# Patient Record
Sex: FEMALE | Race: WHITE | NOT HISPANIC OR LATINO
[De-identification: names, ages, dates, MRNs, and addresses within clinical notes are randomized per-mention and may not be internally consistent; named-entity substitution may affect disease eponyms.]

---

## 2020-10-27 ENCOUNTER — APPOINTMENT (OUTPATIENT)
Dept: VASCULAR SURGERY | Facility: CLINIC | Age: 65
End: 2020-10-27
Payer: MEDICARE

## 2020-10-27 VITALS
WEIGHT: 112 LBS | BODY MASS INDEX: 19.12 KG/M2 | HEIGHT: 64 IN | TEMPERATURE: 99.1 F | SYSTOLIC BLOOD PRESSURE: 125 MMHG | DIASTOLIC BLOOD PRESSURE: 71 MMHG | OXYGEN SATURATION: 96 % | HEART RATE: 65 BPM

## 2020-10-27 DIAGNOSIS — I83.893 VARICOSE VEINS OF BILATERAL LOWER EXTREMITIES WITH OTHER COMPLICATIONS: ICD-10-CM

## 2020-10-27 DIAGNOSIS — I83.813 VARICOSE VEINS OF BILATERAL LOWER EXTREMITIES WITH PAIN: ICD-10-CM

## 2020-10-27 DIAGNOSIS — I83.10 VARICOSE VEINS OF UNSPECIFIED LOWER EXTREMITY WITH INFLAMMATION: ICD-10-CM

## 2020-10-27 PROBLEM — Z00.00 ENCOUNTER FOR PREVENTIVE HEALTH EXAMINATION: Status: ACTIVE | Noted: 2020-10-27

## 2020-10-27 PROCEDURE — 99203 OFFICE O/P NEW LOW 30 MIN: CPT | Mod: 25

## 2020-10-27 PROCEDURE — 93970 EXTREMITY STUDY: CPT

## 2020-10-27 NOTE — PHYSICAL EXAM
[JVD] : no jugular venous distention  [Normal Breath Sounds] : Normal breath sounds [2+] : left 2+ [Ankle Swelling (On Exam)] : present [Varicose Veins Of Lower Extremities] : present [Varicose Veins Of The Right Leg] : of the right leg [Ankle Swelling Bilaterally] : severe [] : bilaterally [Ankle Swelling On The Left] : moderate [No Rash or Lesion] : No rash or lesion [Purpura] : no purpura  [Petechiae] : no petechiae [Skin Ulcer] : no ulcer [Skin Induration] : no induration [Alert] : alert [Oriented to Person] : oriented to person [Oriented to Place] : oriented to place [Oriented to Time] : oriented to time [Calm] : calm [de-identified] : wdwn nad [de-identified] : wnl [de-identified] : shellyl [FreeTextEntry1] : huge rt leg branch varicose veins [de-identified] : wnl [de-identified] : as above

## 2020-10-27 NOTE — DATA REVIEWED
[FreeTextEntry1] : RVT Mike performed b/l vle - shows severe rt gsv, rt lsv and left gsv reflux with huge rt leg branch varicose veins

## 2020-10-27 NOTE — PHYSICAL EXAM
[JVD] : no jugular venous distention  [Normal Breath Sounds] : Normal breath sounds [2+] : left 2+ [Ankle Swelling (On Exam)] : present [Varicose Veins Of Lower Extremities] : present [Varicose Veins Of The Right Leg] : of the right leg [Ankle Swelling Bilaterally] : severe [] : bilaterally [Ankle Swelling On The Left] : moderate [No Rash or Lesion] : No rash or lesion [Purpura] : no purpura  [Petechiae] : no petechiae [Skin Ulcer] : no ulcer [Skin Induration] : no induration [Alert] : alert [Oriented to Person] : oriented to person [Oriented to Place] : oriented to place [Oriented to Time] : oriented to time [Calm] : calm [de-identified] : wdwn nad [de-identified] : wnl [de-identified] : shellyl [FreeTextEntry1] : huge rt leg branch varicose veins [de-identified] : wnl [de-identified] : as above

## 2020-10-27 NOTE — PHYSICAL EXAM
[JVD] : no jugular venous distention  [Normal Breath Sounds] : Normal breath sounds [2+] : left 2+ [Ankle Swelling (On Exam)] : present [Varicose Veins Of Lower Extremities] : present [Varicose Veins Of The Right Leg] : of the right leg [Ankle Swelling Bilaterally] : severe [] : bilaterally [Ankle Swelling On The Left] : moderate [No Rash or Lesion] : No rash or lesion [Purpura] : no purpura  [Petechiae] : no petechiae [Skin Ulcer] : no ulcer [Skin Induration] : no induration [Alert] : alert [Oriented to Person] : oriented to person [Oriented to Place] : oriented to place [Oriented to Time] : oriented to time [Calm] : calm [de-identified] : wdwn nad [de-identified] : wnl [de-identified] : shellyl [FreeTextEntry1] : huge rt leg branch varicose veins [de-identified] : wnl [de-identified] : as above

## 2020-10-27 NOTE — ASSESSMENT
[FreeTextEntry1] : Vascular surgeon discussed with the patient:\par Varicose veins are enlarged, twisted veins. Varicose veins are caused by increased blood pressure in the veins.  The blood moves towards the heart by 1-way valves in the veins. When the valves become weakened or damaged, blood can collect in the veins. This causes the veins to become enlarged. Sitting or standing for long periods can cause blood to pool in the leg veins, increasing the pressure within the veins. The veins can stretch from the increased pressure. This may weaken the walls of the veins and damage the valves.\par Varicose veins may be more common in some families (inherited).  Factors that may increase pressure include:  obesity, older age, being female, pregnancy, taking oral contraceptive pills or hormone replacement, being inactive, and/or smoking. \par The most common symptoms of varicose veins are sensations in the legs, such as a heavy feeling, burning, and/or aching. However, each individual may experience symptoms differently.  Other symptoms may include:  color changes in the skin, sores on the legs, or rash.  Severe varicose veins may eventually produce long-term mild swelling that can result in more serious skin and tissue problems, such as ulcers and non-healing sores.\par Varicose veins are diagnosed by a complete medical history, physical examination, and diagnostic studies for varicose veins including duplex ultrasound and color-flow imaging.  \par Medical treatment for varicose veins include:  compression stockings, sclerotherapy, endovenous laser ablation and/or surgical treatment microphlebectomy.  \par \par \par  [Other: _____] : [unfilled]

## 2020-10-27 NOTE — HISTORY OF PRESENT ILLNESS
[FreeTextEntry1] : Vascular surgeon discussed with the patient:\par Varicose veins are enlarged, twisted veins. Varicose veins are caused by increased blood pressure in the veins.  The blood moves towards the heart by 1-way valves in the veins. When the valves become weakened or damaged, blood can collect in the veins. This causes the veins to become enlarged. Sitting or standing for long periods can cause blood to pool in the leg veins, increasing the pressure within the veins. The veins can stretch from the increased pressure. This may weaken the walls of the veins and damage the valves.\par Varicose veins may be more common in some families (inherited).  Factors that may increase pressure include:  obesity, older age, being female, pregnancy, taking oral contraceptive pills or hormone replacement, being inactive, and/or smoking. \par The most common symptoms of varicose veins are sensations in the legs, such as a heavy feeling, burning, and/or aching. However, each individual may experience symptoms differently.  Other symptoms may include:  color changes in the skin, sores on the legs, or rash.  Severe varicose veins may eventually produce long-term mild swelling that can result in more serious skin and tissue problems, such as ulcers and non-healing sores.\par Varicose veins are diagnosed by a complete medical history, physical examination, and diagnostic studies for varicose veins including duplex ultrasound and color-flow imaging.  \par Medical treatment for varicose veins include:  compression stockings, sclerotherapy, endovenous laser ablation and/or surgical treatment microphlebectomy.  \par \par \par

## 2020-10-30 ENCOUNTER — TRANSCRIPTION ENCOUNTER (OUTPATIENT)
Age: 65
End: 2020-10-30

## 2020-11-03 ENCOUNTER — APPOINTMENT (OUTPATIENT)
Dept: VASCULAR SURGERY | Facility: CLINIC | Age: 65
End: 2020-11-03
Payer: MEDICARE

## 2020-11-03 VITALS
DIASTOLIC BLOOD PRESSURE: 72 MMHG | HEART RATE: 68 BPM | OXYGEN SATURATION: 97 % | SYSTOLIC BLOOD PRESSURE: 108 MMHG | TEMPERATURE: 98.8 F

## 2020-11-03 PROCEDURE — 36478 ENDOVENOUS LASER 1ST VEIN: CPT | Mod: RT

## 2020-11-03 NOTE — PROCEDURE
[FreeTextEntry1] : pt to return for rt gvs, rt lsv, left gsv evlt \par Rx given for thigh high support hose 20-30 mmhg [FreeTextEntry3] : Right gsv endovenous laser ablation. \par \par Right Lower extremity varicose veins with inflammation, leg pain, leg swelling, and leg cramping.  Venous insufficiency, chronic venous hypertension and venous reflux.\par \par Ms. MARI CHÁVEZ is a 65 year year old F with a history of right lower extremity varicose veins previously seen in the office.  Ultrasound examination demonstrated reflux in the right gsv vein dumping into the patient’s varicosities in the right leg.  A trial of compression stockings, exercise, elevation, and pain medication was attempted without relief and as such, this patient was offered endovenous laser ablation therapy.  After explaining risks and benefits, informed consent was obtained and the patient agreed to proceed.  \par \par The patient was escorted to the procedure room and placed in the supine position on the examination table.  Laser safety glasses were placed on the patient.  The right leg was prepped and draped in the usual sterile fashion and time-out was called.  A sonographic probe was placed into a sterile sheath and the right lower extremity was imaged.  The right greater saphenous vein was identified under sonographic guidance and 1 mL of 1% lidocaine was administered subcutaneously using a 25G needle.  Using standard Seldinger technique, access to the right greater saphenous vein with the needle and corresponding guidewire was obtained.  The 4Fr Raymundo-Sheath introducer was advanced over the wire to the treatment location.  The position of the sheath tip is 2 cm below the Sapheno-Femoral Junction and verified using ultrasound guidance.  Dilator and guidewire removed.   NeverTouch laser fiber inserted into the sheath until the Fiber Stop Assembly came in contact with the end of the Sheath Hub.  Fiber stop assembly removed and pulled the sheath back and locked to the Sheath-Nisa fitting.  Fiber location confirmed using ultrasound guidance.\par \par Local tumescent anesthesia under ultrasound guidance was administered to ensure delivery of just enough anesthesia, approximately 250 mL, to achieve thermal protection.\par Anesthesia:  Tumescent anesthesia.  Tumescent anesthesia:  250 mL 0.9% Sodium Chloride for injection poured into sterile blue basin and added 83 mL sterile injectable lidocaine 1% (without Epinephrine) using 20 mL syringe.\par \par Laser was set to continuous mode and adjusted to desired power.  Laser placed in Enable mode.  The laser was activated by depressing the foot pedal while withdrawing the fiber and sheath together at a rate of 1 cm per 5 seconds.  The operating of the laser was ceased by removing the foot from foot pedal when the fiber end was 2 - 3 cm from the access site as indicated by markers on the distal tip of the Raymundo-Sheath Introducer.   The sheath and laser fiber was removed and manual pressure applied at access site for 5 minutes.  Post procedure, the vein was imaged and showed successful closure of the right greater saphenous vein.  Dry dressing applied to access site, compression stocking 20 - 30 mm Hg applied to treated extremity.  The patient tolerated the procedure well with no complications.  Vital signs stable, patient was monitored throughout procedure.  The patient was escorted to the changing room.  \par \par Refer to procedure sheet for procedure start and end time, and reddy, length, energy and time documentation. \par \par Post-Op Instructions:  The following instructions were reviewed with the patient and a print out of the instructions provided to patient at time of visit:   1)  Compression stocking to be worn 24 hours per day x 7 days.  2)  Do not get the stocking wet for the first 3 days.  3)  Ambulation immediately following procedure and as much as possible for the first 7 days.  4)  Contact the office if any questions or concerns. 5)  Patient must follow-up within the first week for post procedure reflux study performed in office.  Reviewed with the patient the follow up visit is to ensure that there are no complications such as a deep vein thrombosis (DVT).  Patient acknowledged understanding of post-op instructions and was provided with print out of instructions at time of visit.  \par \par Reviewed with the patient post-procedure endovenous laser ablation (EVLT) instructions, provided patient with printed copy of instructions along with Dr. Wagner’s cell phone number:  [phone], and advised patient to call for any questions or concerns prior to follow up visit.  All questions answered. \par \par

## 2020-11-03 NOTE — REASON FOR VISIT
[Procedure: _________] : a [unfilled] procedure visit [FreeTextEntry1] : Patient has severe reflux right gsv and here for right gsv EVLT procedure.  MARI CHÁVEZ proceeded with right gsv EVLT with no complications.  MARI CHÁVEZ will follow up in one week for routine visit with VLE.\par

## 2020-11-03 NOTE — REASON FOR VISIT
[Initial Evaluation] : an initial evaluation [Procedure: _________] : a [unfilled] procedure visit [FreeTextEntry1] : Patient has severe reflux right gsv and here for right gsv EVLT procedure.  MARI CHÁVEZ proceeded with right gsv EVLT with no complications.  MARI CHÁVEZ will follow up in one week for routine visit with VLE.\par

## 2020-11-03 NOTE — PROCEDURE
[FreeTextEntry1] : pt to return for rt gvs, rt lsv, left gsv evlt \par Rx given for thigh high support hose 20-30 mmhg [FreeTextEntry3] : Right gsv endovenous laser ablation. \par \par Right Lower extremity varicose veins with inflammation, leg pain, leg swelling, and leg cramping.  Venous insufficiency, chronic venous hypertension and venous reflux.\par \par Ms. MARI CHÁVEZ is a 65 year year old F with a history of right lower extremity varicose veins previously seen in the office.  Ultrasound examination demonstrated reflux in the right gsv vein dumping into the patient’s varicosities in the right leg.  A trial of compression stockings, exercise, elevation, and pain medication was attempted without relief and as such, this patient was offered endovenous laser ablation therapy.  After explaining risks and benefits, informed consent was obtained and the patient agreed to proceed.  \par \par The patient was escorted to the procedure room and placed in the supine position on the examination table.  Laser safety glasses were placed on the patient.  The right leg was prepped and draped in the usual sterile fashion and time-out was called.  A sonographic probe was placed into a sterile sheath and the right lower extremity was imaged.  The right greater saphenous vein was identified under sonographic guidance and 1 mL of 1% lidocaine was administered subcutaneously using a 25G needle.  Using standard Seldinger technique, access to the right greater saphenous vein with the needle and corresponding guidewire was obtained.  The 4Fr Raymundo-Sheath introducer was advanced over the wire to the treatment location.  The position of the sheath tip is 2 cm below the Sapheno-Femoral Junction and verified using ultrasound guidance.  Dilator and guidewire removed.   NeverTouch laser fiber inserted into the sheath until the Fiber Stop Assembly came in contact with the end of the Sheath Hub.  Fiber stop assembly removed and pulled the sheath back and locked to the Sheath-Nisa fitting.  Fiber location confirmed using ultrasound guidance.\par \par Local tumescent anesthesia under ultrasound guidance was administered to ensure delivery of just enough anesthesia, approximately 250 mL, to achieve thermal protection.\par Anesthesia:  Tumescent anesthesia.  Tumescent anesthesia:  250 mL 0.9% Sodium Chloride for injection poured into sterile blue basin and added 83 mL sterile injectable lidocaine 1% (without Epinephrine) using 20 mL syringe.\par \par Laser was set to continuous mode and adjusted to desired power.  Laser placed in Enable mode.  The laser was activated by depressing the foot pedal while withdrawing the fiber and sheath together at a rate of 1 cm per 5 seconds.  The operating of the laser was ceased by removing the foot from foot pedal when the fiber end was 2 - 3 cm from the access site as indicated by markers on the distal tip of the Raymundo-Sheath Introducer.   The sheath and laser fiber was removed and manual pressure applied at access site for 5 minutes.  Post procedure, the vein was imaged and showed successful closure of the right greater saphenous vein.  Dry dressing applied to access site, compression stocking 20 - 30 mm Hg applied to treated extremity.  The patient tolerated the procedure well with no complications.  Vital signs stable, patient was monitored throughout procedure.  The patient was escorted to the changing room.  \par \par Refer to procedure sheet for procedure start and end time, and reddy, length, energy and time documentation. \par \par Post-Op Instructions:  The following instructions were reviewed with the patient and a print out of the instructions provided to patient at time of visit:   1)  Compression stocking to be worn 24 hours per day x 7 days.  2)  Do not get the stocking wet for the first 3 days.  3)  Ambulation immediately following procedure and as much as possible for the first 7 days.  4)  Contact the office if any questions or concerns. 5)  Patient must follow-up within the first week for post procedure reflux study performed in office.  Reviewed with the patient the follow up visit is to ensure that there are no complications such as a deep vein thrombosis (DVT).  Patient acknowledged understanding of post-op instructions and was provided with print out of instructions at time of visit.  \par \par Reviewed with the patient post-procedure endovenous laser ablation (EVLT) instructions, provided patient with printed copy of instructions along with Dr. Wagner’s cell phone number:  [phone], and advised patient to call for any questions or concerns prior to follow up visit.  All questions answered. \par \par

## 2020-11-03 NOTE — PROCEDURE
[FreeTextEntry1] : pt to return for rt gvs, rt lsv, left gsv evlt \par Rx given for thigh high support hose 20-30 mmhg [FreeTextEntry3] : Right gsv endovenous laser ablation. \par \par Right Lower extremity varicose veins with inflammation, leg pain, leg swelling, and leg cramping.  Venous insufficiency, chronic venous hypertension and venous reflux.\par \par Ms. MARI CHÁVEZ is a 65 year year old F with a history of right lower extremity varicose veins previously seen in the office.  Ultrasound examination demonstrated reflux in the right gsv vein dumping into the patient’s varicosities in the right leg.  A trial of compression stockings, exercise, elevation, and pain medication was attempted without relief and as such, this patient was offered endovenous laser ablation therapy.  After explaining risks and benefits, informed consent was obtained and the patient agreed to proceed.  \par \par The patient was escorted to the procedure room and placed in the supine position on the examination table.  Laser safety glasses were placed on the patient.  The right leg was prepped and draped in the usual sterile fashion and time-out was called.  A sonographic probe was placed into a sterile sheath and the right lower extremity was imaged.  The right greater saphenous vein was identified under sonographic guidance and 1 mL of 1% lidocaine was administered subcutaneously using a 25G needle.  Using standard Seldinger technique, access to the right greater saphenous vein with the needle and corresponding guidewire was obtained.  The 4Fr Raymundo-Sheath introducer was advanced over the wire to the treatment location.  The position of the sheath tip is 2 cm below the Sapheno-Femoral Junction and verified using ultrasound guidance.  Dilator and guidewire removed.   NeverTouch laser fiber inserted into the sheath until the Fiber Stop Assembly came in contact with the end of the Sheath Hub.  Fiber stop assembly removed and pulled the sheath back and locked to the Sheath-Nisa fitting.  Fiber location confirmed using ultrasound guidance.\par \par Local tumescent anesthesia under ultrasound guidance was administered to ensure delivery of just enough anesthesia, approximately 250 mL, to achieve thermal protection.\par Anesthesia:  Tumescent anesthesia.  Tumescent anesthesia:  250 mL 0.9% Sodium Chloride for injection poured into sterile blue basin and added 83 mL sterile injectable lidocaine 1% (without Epinephrine) using 20 mL syringe.\par \par Laser was set to continuous mode and adjusted to desired power.  Laser placed in Enable mode.  The laser was activated by depressing the foot pedal while withdrawing the fiber and sheath together at a rate of 1 cm per 5 seconds.  The operating of the laser was ceased by removing the foot from foot pedal when the fiber end was 2 - 3 cm from the access site as indicated by markers on the distal tip of the Raymundo-Sheath Introducer.   The sheath and laser fiber was removed and manual pressure applied at access site for 5 minutes.  Post procedure, the vein was imaged and showed successful closure of the right greater saphenous vein.  Dry dressing applied to access site, compression stocking 20 - 30 mm Hg applied to treated extremity.  The patient tolerated the procedure well with no complications.  Vital signs stable, patient was monitored throughout procedure.  The patient was escorted to the changing room.  \par \par Refer to procedure sheet for procedure start and end time, and reddy, length, energy and time documentation. \par \par Post-Op Instructions:  The following instructions were reviewed with the patient and a print out of the instructions provided to patient at time of visit:   1)  Compression stocking to be worn 24 hours per day x 7 days.  2)  Do not get the stocking wet for the first 3 days.  3)  Ambulation immediately following procedure and as much as possible for the first 7 days.  4)  Contact the office if any questions or concerns. 5)  Patient must follow-up within the first week for post procedure reflux study performed in office.  Reviewed with the patient the follow up visit is to ensure that there are no complications such as a deep vein thrombosis (DVT).  Patient acknowledged understanding of post-op instructions and was provided with print out of instructions at time of visit.  \par \par Reviewed with the patient post-procedure endovenous laser ablation (EVLT) instructions, provided patient with printed copy of instructions along with Dr. Wagner’s cell phone number:  [phone], and advised patient to call for any questions or concerns prior to follow up visit.  All questions answered. \par \par

## 2020-11-03 NOTE — PROCEDURE
[FreeTextEntry3] : Right gsv endovenous laser ablation. \par \par Right Lower extremity varicose veins with inflammation, leg pain, leg swelling, and leg cramping.  Venous insufficiency, chronic venous hypertension and venous reflux.\par \par Ms. MARI CHÁVEZ is a 65 year year old F with a history of right lower extremity varicose veins previously seen in the office.  Ultrasound examination demonstrated reflux in the right gsv vein dumping into the patient’s varicosities in the right leg.  A trial of compression stockings, exercise, elevation, and pain medication was attempted without relief and as such, this patient was offered endovenous laser ablation therapy.  After explaining risks and benefits, informed consent was obtained and the patient agreed to proceed.  \par \par The patient was escorted to the procedure room and placed in the supine position on the examination table.  Laser safety glasses were placed on the patient.  The right leg was prepped and draped in the usual sterile fashion and time-out was called.  A sonographic probe was placed into a sterile sheath and the right lower extremity was imaged.  The right greater saphenous vein was identified under sonographic guidance and 1 mL of 1% lidocaine was administered subcutaneously using a 25G needle.  Using standard Seldinger technique, access to the right greater saphenous vein with the needle and corresponding guidewire was obtained.  The 4Fr Raymundo-Sheath introducer was advanced over the wire to the treatment location.  The position of the sheath tip is 2 cm below the Sapheno-Femoral Junction and verified using ultrasound guidance.  Dilator and guidewire removed.   NeverTouch laser fiber inserted into the sheath until the Fiber Stop Assembly came in contact with the end of the Sheath Hub.  Fiber stop assembly removed and pulled the sheath back and locked to the Sheath-Nisa fitting.  Fiber location confirmed using ultrasound guidance.\par \par Local tumescent anesthesia under ultrasound guidance was administered to ensure delivery of just enough anesthesia, approximately 250 mL, to achieve thermal protection.\par Anesthesia:  Tumescent anesthesia.  Tumescent anesthesia:  250 mL 0.9% Sodium Chloride for injection poured into sterile blue basin and added 83 mL sterile injectable lidocaine 1% (without Epinephrine) using 20 mL syringe.\par \par Laser was set to continuous mode and adjusted to desired power.  Laser placed in Enable mode.  The laser was activated by depressing the foot pedal while withdrawing the fiber and sheath together at a rate of 1 cm per 5 seconds.  The operating of the laser was ceased by removing the foot from foot pedal when the fiber end was 2 - 3 cm from the access site as indicated by markers on the distal tip of the Raymundo-Sheath Introducer.   The sheath and laser fiber was removed and manual pressure applied at access site for 5 minutes.  Post procedure, the vein was imaged and showed successful closure of the right greater saphenous vein.  Dry dressing applied to access site, compression stocking 20 - 30 mm Hg applied to treated extremity.  The patient tolerated the procedure well with no complications.  Vital signs stable, patient was monitored throughout procedure.  The patient was escorted to the changing room.  \par \par Refer to procedure sheet for procedure start and end time, and reddy, length, energy and time documentation. \par \par Post-Op Instructions:  The following instructions were reviewed with the patient and a print out of the instructions provided to patient at time of visit:   1)  Compression stocking to be worn 24 hours per day x 7 days.  2)  Do not get the stocking wet for the first 3 days.  3)  Ambulation immediately following procedure and as much as possible for the first 7 days.  4)  Contact the office if any questions or concerns. 5)  Patient must follow-up within the first week for post procedure reflux study performed in office.  Reviewed with the patient the follow up visit is to ensure that there are no complications such as a deep vein thrombosis (DVT).  Patient acknowledged understanding of post-op instructions and was provided with print out of instructions at time of visit.  \par \par Reviewed with the patient post-procedure endovenous laser ablation (EVLT) instructions, provided patient with printed copy of instructions along with Dr. Wagner’s cell phone number:  [phone], and advised patient to call for any questions or concerns prior to follow up visit.  All questions answered. \par \par

## 2020-11-10 ENCOUNTER — APPOINTMENT (OUTPATIENT)
Dept: VASCULAR SURGERY | Facility: CLINIC | Age: 65
End: 2020-11-10
Payer: MEDICARE

## 2020-11-10 VITALS
SYSTOLIC BLOOD PRESSURE: 107 MMHG | HEART RATE: 62 BPM | DIASTOLIC BLOOD PRESSURE: 62 MMHG | OXYGEN SATURATION: 99 % | TEMPERATURE: 98.2 F

## 2020-11-10 PROCEDURE — 99214 OFFICE O/P EST MOD 30 MIN: CPT | Mod: 25

## 2020-11-10 PROCEDURE — 93971 EXTREMITY STUDY: CPT

## 2020-11-10 NOTE — DATA REVIEWED
[FreeTextEntry1] : RVBRIANNA Mckeon performed rt gsv vle s/p rt gsv evlt - gsv closed no dvt + rt sf junction compressible and patent

## 2020-11-10 NOTE — REASON FOR VISIT
[Follow-Up: _____] : a [unfilled] follow-up visit [FreeTextEntry1] : She is s/p right gsv EVLT.  Patient is here today for routine one week follow up visit with ultrasound.  Right leg ultrasound confirms no dvt, no HIIT, no truncal reflux and right gsv is closed as desired. Pt to return next for rt lsv evlt.\par

## 2020-11-10 NOTE — PHYSICAL EXAM
[JVD] : no jugular venous distention  [Normal Breath Sounds] : Normal breath sounds [2+] : left 2+ [Ankle Swelling (On Exam)] : present [Varicose Veins Of Lower Extremities] : present [Varicose Veins Of The Right Leg] : of the right leg [Ankle Swelling Bilaterally] : severe [] : bilaterally [Ankle Swelling On The Left] : moderate [No Rash or Lesion] : No rash or lesion [Purpura] : no purpura  [Petechiae] : no petechiae [Skin Ulcer] : no ulcer [Skin Induration] : no induration [Alert] : alert [Oriented to Person] : oriented to person [Oriented to Place] : oriented to place [Oriented to Time] : oriented to time [Calm] : calm [de-identified] : wdwn nad [de-identified] : wnl [de-identified] : shellyl [FreeTextEntry1] : large rt leg/calf branch varicose veins [de-identified] : wnl [de-identified] : as above

## 2020-11-23 ENCOUNTER — APPOINTMENT (OUTPATIENT)
Dept: VASCULAR SURGERY | Facility: CLINIC | Age: 65
End: 2020-11-23
Payer: MEDICARE

## 2020-11-23 VITALS
SYSTOLIC BLOOD PRESSURE: 93 MMHG | HEART RATE: 71 BPM | TEMPERATURE: 98.4 F | DIASTOLIC BLOOD PRESSURE: 61 MMHG | OXYGEN SATURATION: 98 %

## 2020-11-23 PROCEDURE — 36478 ENDOVENOUS LASER 1ST VEIN: CPT | Mod: RT

## 2020-11-23 NOTE — REASON FOR VISIT
[Procedure: _________] : a [unfilled] procedure visit [FreeTextEntry1] : Patient has severe reflux right lsv and here for right lsv EVLT procedure.  ALVARO CHÁVEZ proceeded with right lsv EVLT with no complications.  ALVARO CHÁVEZ will follow up in one week for routine visit with VLE.\par

## 2020-11-23 NOTE — PROCEDURE
[FreeTextEntry3] : Right lsv endovenous laser ablation. \par \par Right Lower extremity varicose veins with inflammation, leg pain, leg swelling, and leg cramping.  Venous insufficiency, chronic venous hypertension and venous reflux.\par \par Ms. ALVARO CHÁVEZ is a 65 year year old F with a history of right lower extremity varicose veins previously seen in the office.  Ultrasound examination demonstrated reflux in the right lsv vein dumping into the patient’s varicosities in the right leg.  A trial of compression stockings, exercise, elevation, and pain medication was attempted without relief and as such, this patient was offered endovenous laser ablation therapy.  After explaining risks and benefits, informed consent was obtained and the patient agreed to proceed.  \par \par The patient was escorted to the procedure room and placed in the prone position on the examination table.  Laser safety glasses were placed on the patient.  The right leg was prepped and draped in the usual sterile fashion and time-out was called.  A sonographic probe was placed into a sterile sheath and the right lower extremity was imaged.  The right lesser saphenous vein was identified under sonographic guidance and 1 mL of 1% lidocaine was administered subcutaneously using a 25G needle.  Using standard Seldinger technique, access to the right lesser saphenous vein with the needle and corresponding guidewire was obtained.  The 4Fr Raymundo-Sheath introducer was advanced over the wire to the treatment location.  The position of the sheath tip is 2 cm below the Sapheno-Popliteal Junction and verified using ultrasound guidance.  Dilator and guidewire removed.   NeverTouch laser fiber inserted into the sheath until the Fiber Stop Assembly came in contact with the end of the Sheath Hub.  Fiber stop assembly removed and pulled the sheath back and locked to the Sheath-Nisa fitting.  Fiber location confirmed using ultrasound guidance.\par \par Local tumescent anesthesia under ultrasound guidance was administered to ensure delivery of just enough anesthesia, approximately 250 mL, to achieve thermal protection.\par Anesthesia:  Tumescent anesthesia.  Tumescent anesthesia:  250 mL 0.9% Sodium Chloride for injection poured into sterile blue basin and added 83 mL sterile injectable lidocaine 1% (without Epinephrine) using 60 mL syringe.\par \par Laser was set to continuous mode and adjusted to desired power.  Laser placed in Enable mode.  The laser was activated by depressing the foot pedal while withdrawing the fiber and sheath together at a rate of 1 cm per 5 seconds.  The operating of the laser was ceased by removing the foot from foot pedal when the fiber end was 2 - 3 cm from the access site as indicated by markers on the distal tip of the Raymundo-Sheath Introducer.   The sheath and laser fiber was removed and manual pressure applied at access site for 5 minutes.  Post procedure, the vein was imaged and showed successful closure of the right lesser saphenous vein.  Dry dressing applied to access site, compression stocking 20 - 30 mm Hg applied to treated extremity.  The patient tolerated the procedure well with no complications.  Vital signs stable, patient was monitored throughout procedure.  The patient was escorted to the changing room.  \par \par Refer to procedure sheet for procedure start and end time, and reddy, length, energy and time documentation. \par \par Post-Op Instructions:  The following instructions were reviewed with the patient and a print out of the instructions provided to patient at time of visit:   1)  Compression stocking to be worn 24 hours per day x 7 days.  2)  Do not get the stocking wet for the first 3 days.  3)  Ambulation immediately following procedure and as much as possible for the first 7 days.  4)  Contact the office if any questions or concerns. 5)  Patient must follow-up within the first week for post procedure reflux study performed in office.  Reviewed with the patient the follow up visit is to ensure that there are no complications such as a deep vein thrombosis (DVT).  Patient acknowledged understanding of post-op instructions and was provided with print out of instructions at time of visit.  \par \par Reviewed with the patient post-procedure endovenous laser ablation (EVLT) instructions, provided patient with printed copy of instructions along with Dr. Wagner’s cell phone number:  [phone], and advised patient to call for any questions or concerns prior to follow up visit.  All questions answered.\par \par \par

## 2020-12-01 ENCOUNTER — APPOINTMENT (OUTPATIENT)
Dept: VASCULAR SURGERY | Facility: CLINIC | Age: 65
End: 2020-12-01
Payer: MEDICARE

## 2020-12-01 VITALS
SYSTOLIC BLOOD PRESSURE: 95 MMHG | DIASTOLIC BLOOD PRESSURE: 59 MMHG | OXYGEN SATURATION: 97 % | TEMPERATURE: 98.1 F | HEART RATE: 68 BPM

## 2020-12-01 PROCEDURE — 93971 EXTREMITY STUDY: CPT

## 2020-12-01 PROCEDURE — 99214 OFFICE O/P EST MOD 30 MIN: CPT | Mod: 25

## 2020-12-01 NOTE — REASON FOR VISIT
[Follow-Up: _____] : a [unfilled] follow-up visit [FreeTextEntry1] : She is s/p right lsv EVLT.  Patient is here today for routine one week follow up visit with ultrasound.  Right leg ultrasound confirms no dvt, no HIIT, no truncal reflux and right lsv is closed as desired.\par

## 2020-12-01 NOTE — DATA REVIEWED
[FreeTextEntry1] : KEY Mckeon performed rt lsv vle s/p rt lsv evlt - no dvt, lsv closed as desired, SP junction patent and compressible - rt gsv also clsoed s/p rt gsv evlt

## 2020-12-01 NOTE — PHYSICAL EXAM
[JVD] : no jugular venous distention  [Normal Breath Sounds] : Normal breath sounds [2+] : left 2+ [Ankle Swelling (On Exam)] : present [Varicose Veins Of Lower Extremities] : present [Varicose Veins Of The Right Leg] : of the right leg [Ankle Swelling Bilaterally] : severe [] : bilaterally [Ankle Swelling On The Left] : moderate [No Rash or Lesion] : No rash or lesion [Purpura] : no purpura  [Petechiae] : no petechiae [Skin Ulcer] : no ulcer [Skin Induration] : no induration [Alert] : alert [Oriented to Person] : oriented to person [Oriented to Place] : oriented to place [Oriented to Time] : oriented to time [Calm] : calm [de-identified] : wdwn nad [de-identified] : wnl [de-identified] : shellyl [FreeTextEntry1] : large rt leg/calf branch varicose veins [de-identified] : wnl [de-identified] : as above

## 2020-12-07 ENCOUNTER — APPOINTMENT (OUTPATIENT)
Dept: VASCULAR SURGERY | Facility: CLINIC | Age: 65
End: 2020-12-07
Payer: MEDICARE

## 2020-12-07 VITALS
OXYGEN SATURATION: 93 % | SYSTOLIC BLOOD PRESSURE: 115 MMHG | DIASTOLIC BLOOD PRESSURE: 65 MMHG | TEMPERATURE: 98.1 F | HEART RATE: 74 BPM

## 2020-12-07 PROCEDURE — 36478 ENDOVENOUS LASER 1ST VEIN: CPT | Mod: LT

## 2020-12-07 NOTE — REASON FOR VISIT
[Procedure: _________] : a [unfilled] procedure visit [FreeTextEntry1] : Patient has severe reflux left gsv and here for left gsv EVLT procedure.  ALVARO CHÁVEZ proceeded with left gsv EVLT with no complications.  ALVARO CHÁVEZ will follow up in one week for routine visit with VLE.\par

## 2020-12-07 NOTE — PROCEDURE
[FreeTextEntry3] : Ms. ALVARO CHÁVEZ is a 65 year year old F with a history of left lower extremity varicose veins previously seen in the office.  Ultrasound examination demonstrated reflux in the left gsv vein dumping into the patient’s varicosities in the left leg.  A trial of compression stockings, exercise, elevation, and pain medication was attempted without relief and as such, this patient was offered endovenous laser ablation therapy.  After explaining risks and benefits, informed consent was obtained and the patient agreed to proceed.  \par \par The patient was escorted to the procedure room and placed in the supine position on the examination table.  Laser safety glasses were placed on the patient.  The left leg was prepped and draped in the usual sterile fashion and time-out was called.  A sonographic probe was placed into a sterile sheath and the left lower extremity was imaged.  The left greater saphenous vein was identified under sonographic guidance and 1 mL of 1% lidocaine was administered subcutaneously using a 25G needle.  Using standard Seldinger technique, access to the left greater saphenous vein with the needle and corresponding guidewire was obtained.  The 4Fr Raymundo-Sheath introducer was advanced over the wire to the treatment location.  The position of the sheath tip is 2 cm below the Sapheno-Femoral Junction and verified using ultrasound guidance.  Dilator and guidewire removed.   NeverTouch laser fiber inserted into the sheath until the Fiber Stop Assembly came in contact with the end of the Sheath Hub.  Fiber stop assembly removed and pulled the sheath back and locked to the Sheath-Nisa fitting.  Fiber location confirmed using ultrasound guidance.\par \par Local tumescent anesthesia under ultrasound guidance was administered to ensure delivery of just enough anesthesia, approximately 250 mL, to achieve thermal protection.\par Anesthesia:  Tumescent anesthesia.  Tumescent anesthesia:  250 mL 0.9% Sodium Chloride for injection poured into sterile blue basin and added 83 mL sterile injectable lidocaine 1% (without Epinephrine) using 20 mL syringe.\par \par Laser was set to continuous mode and adjusted to desired power.  Laser placed in Enable mode.  The laser was activated by depressing the foot pedal while withdrawing the fiber and sheath together at a rate of 1 cm per 5 seconds.  The operating of the laser was ceased by removing the foot from foot pedal when the fiber end was 2 - 3 cm from the access site as indicated by markers on the distal tip of the Raymundo-Sheath Introducer.   The sheath and laser fiber was removed and manual pressure applied at access site for 5 minutes.  Post procedure, the vein was imaged and showed successful closure of the left greater saphenous vein.  Dry dressing applied to access site, compression stocking 20 - 30 mm Hg applied to treated extremity.  The patient tolerated the procedure well with no complications.  Vital signs stable, patient was monitored throughout procedure.  The patient was escorted to the changing room.  \par \par Refer to procedure sheet for procedure start and end time, and reddy, length, energy and time documentation. \par \par Post-Op Instructions:  The following instructions were reviewed with the patient and a print out of the instructions provided to patient at time of visit:   1)  Compression stocking to be worn 24 hours per day x 7 days.  2)  Do not get the stocking wet for the first 3 days.  3)  Ambulation immediately following procedure and as much as possible for the first 7 days.  4)  Contact the office if any questions or concerns. 5)  Patient must follow-up within the first week for post procedure reflux study performed in office.  Reviewed with the patient the follow up visit is to ensure that there are no complications such as a deep vein thrombosis (DVT).  Patient acknowledged understanding of post-op instructions and was provided with print out of instructions at time of visit.  \par \par Reviewed with the patient post-procedure endovenous laser ablation (EVLT) instructions, provided patient with printed copy of instructions along with Dr. Wagner’s cell phone number:  [phone], and advised patient to call for any questions or concerns prior to follow up visit.  All questions answered.\par \par

## 2020-12-15 ENCOUNTER — APPOINTMENT (OUTPATIENT)
Dept: VASCULAR SURGERY | Facility: CLINIC | Age: 65
End: 2020-12-15
Payer: MEDICARE

## 2020-12-15 VITALS
HEART RATE: 69 BPM | TEMPERATURE: 98.2 F | OXYGEN SATURATION: 97 % | SYSTOLIC BLOOD PRESSURE: 105 MMHG | DIASTOLIC BLOOD PRESSURE: 60 MMHG

## 2020-12-15 PROCEDURE — 93971 EXTREMITY STUDY: CPT

## 2020-12-15 PROCEDURE — 99214 OFFICE O/P EST MOD 30 MIN: CPT | Mod: 25

## 2020-12-15 NOTE — PHYSICAL EXAM
[JVD] : no jugular venous distention  [Normal Breath Sounds] : Normal breath sounds [2+] : left 2+ [Ankle Swelling (On Exam)] : present [Varicose Veins Of Lower Extremities] : present [Varicose Veins Of The Right Leg] : of the right leg [Ankle Swelling Bilaterally] : severe [] : bilaterally [Ankle Swelling On The Left] : moderate [No Rash or Lesion] : No rash or lesion [Purpura] : no purpura  [Petechiae] : no petechiae [Skin Ulcer] : no ulcer [Skin Induration] : no induration [Alert] : alert [Oriented to Person] : oriented to person [Oriented to Place] : oriented to place [Oriented to Time] : oriented to time [Calm] : calm [de-identified] : wdwn nad [de-identified] : wnl [de-identified] : shellyl [FreeTextEntry1] : large rt leg/calf  and left branch varicose veins [de-identified] : wnl [de-identified] : as above

## 2020-12-15 NOTE — DATA REVIEWED
[FreeTextEntry1] : RVT Penalo performed left gsv vle - vein closed s/p left gsv evlt no dvt no  HIIT

## 2020-12-15 NOTE — HISTORY OF PRESENT ILLNESS
[FreeTextEntry1] : No complaints - s/p rt gsv, rt lsv and left gsv evlt - feels good - has remaining large bulging branch veins both legs - will return for stab phlebectomy and then sclerotherapy.

## 2020-12-15 NOTE — ASSESSMENT
[FreeTextEntry1] : pt to return for b/l stab phlebectomy\par \par Vascular surgeon discussed with the patient:\par Varicose veins are enlarged, twisted veins. Varicose veins are caused by increased blood pressure in the veins.  The blood moves towards the heart by 1-way valves in the veins. When the valves become weakened or damaged, blood can collect in the veins. This causes the veins to become enlarged. Sitting or standing for long periods can cause blood to pool in the leg veins, increasing the pressure within the veins. The veins can stretch from the increased pressure. This may weaken the walls of the veins and damage the valves.\par Varicose veins may be more common in some families (inherited).  Factors that may increase pressure include:  obesity, older age, being female, pregnancy, taking oral contraceptive pills or hormone replacement, being inactive, and/or smoking. \par The most common symptoms of varicose veins are sensations in the legs, such as a heavy feeling, burning, and/or aching. However, each individual may experience symptoms differently.  Other symptoms may include:  color changes in the skin, sores on the legs, or rash.  Severe varicose veins may eventually produce long-term mild swelling that can result in more serious skin and tissue problems, such as ulcers and non-healing sores.\par Varicose veins are diagnosed by a complete medical history, physical examination, and diagnostic studies for varicose veins including duplex ultrasound and color-flow imaging.  \par Medical treatment for varicose veins include:  compression stockings, sclerotherapy, endovenous laser ablation and/or surgical treatment microphlebectomy.  \par \par \par

## 2021-01-04 ENCOUNTER — APPOINTMENT (OUTPATIENT)
Dept: VASCULAR SURGERY | Facility: CLINIC | Age: 66
End: 2021-01-04
Payer: MEDICARE

## 2021-01-04 ENCOUNTER — LABORATORY RESULT (OUTPATIENT)
Age: 66
End: 2021-01-04

## 2021-01-04 VITALS
DIASTOLIC BLOOD PRESSURE: 66 MMHG | HEART RATE: 69 BPM | OXYGEN SATURATION: 96 % | TEMPERATURE: 96.8 F | SYSTOLIC BLOOD PRESSURE: 114 MMHG

## 2021-01-04 PROCEDURE — 37765 STAB PHLEB VEINS XTR 10-20: CPT | Mod: RT

## 2021-01-04 NOTE — PROCEDURE
[FreeTextEntry3] : Right lower extremity varicose veins with pain, swelling, and venous insufficiency.\par  \par Varicose veins of the right lower extremity with inflammation.  Venous reflux/insufficiency, leg pain, limb swelling and cramps.  Chronic venous hypertension.\par \par Ms. ALVARO CHÁVEZ is a 65 year year old F with a history of right lower extremity varicose veins previously seen in the office.  Ultrasound examination demonstrated multiple varicosities in the patient’s right leg with venous reflux.  A trial of compression stockings, exercise, elevation, and pain medication was attempted without relief and as such, this patient was offered microphlebectomy therapy.  After explaining risks and benefits, informed consent was obtained and the patient agreed to proceed.  \par \par The patient was escorted to the procedure room.  The varicose veins for treatment were marked out and the patient agreed with the markings.  Patient was placed in the   []   position on the examination table.  The right leg was prepped and draped in the usual sterile fashion and time-out was called.  \par \par Local anesthesia was administered subcutaneously along the marked varicosities for treatment.  Anesthesia:  1% lidocaine without epinephrine.  18G blunt fill needle with 5 micron filter is removed and 25G 11/2 inch needle placed on syringe with medication for injection.  \par \par Total dose of lidocaine without epinephrine: 20\par \par  A total of   10 micro incisions were made over the varicosities that were previously marked out and the veins were grasped using a combination of mosquitoes and forceps.  The veins were removed.  At completion, hemostasis was ensured with digital pressure at the stab sites.  Once complete, the incisions were appropriately closed with 4-0 Monocryl suture and steri-strips.  The right leg was appropriately dressed with 4 inch Kerlix and 4 inch latex-free Ace bandage.  The patient tolerated the procedure well with no complications.  Vital signs stable, patient was monitored throughout.  Patient was escorted to the changing room.  Specimen sent to pathology for evaluation.\par \par Post-Op Instructions:  The following instructions were reviewed with the patient and a hard copy of the instructions provided:   1)  Surgical dressing to remain on for 72 hours.   2)  Do not get the surgical dressing wet for the first 3 days.  3)  Ambulation immediately following procedure and as much as possible.  4)  Contact the office if any questions or concerns; follow-up 2 weeks post-op for evaluation and suture removal.  Patient acknowledged understanding of post-op instructions and was provided with print out of instructions at time of visit.  Provided patient with Dr. Wagner’s cell number:  [phone].  All questions answered.\par \par

## 2021-01-04 NOTE — REASON FOR VISIT
[Procedure: _________] : a [unfilled] procedure visit [FreeTextEntry1] : Patient with painful dilated branch varicose veins right leg.  She is here for right leg microphlebectomy.  ALVARO CHÁVEZ proceeded with right leg microphlebectomy without complications.  She will follow up in 2 weeks for routine visit and suture removal.\par

## 2021-01-19 ENCOUNTER — APPOINTMENT (OUTPATIENT)
Dept: VASCULAR SURGERY | Facility: CLINIC | Age: 66
End: 2021-01-19
Payer: MEDICARE

## 2021-01-19 VITALS
DIASTOLIC BLOOD PRESSURE: 58 MMHG | OXYGEN SATURATION: 98 % | HEART RATE: 68 BPM | TEMPERATURE: 98 F | SYSTOLIC BLOOD PRESSURE: 109 MMHG

## 2021-01-19 PROCEDURE — 99214 OFFICE O/P EST MOD 30 MIN: CPT

## 2021-01-19 NOTE — PHYSICAL EXAM
[JVD] : no jugular venous distention  [Normal Breath Sounds] : Normal breath sounds [2+] : left 2+ [Ankle Swelling (On Exam)] : present [Varicose Veins Of Lower Extremities] : present [Varicose Veins Of The Right Leg] : of the right leg [Ankle Swelling Bilaterally] : severe [] : bilaterally [Ankle Swelling On The Left] : moderate [No Rash or Lesion] : No rash or lesion [Purpura] : no purpura  [Petechiae] : no petechiae [Skin Ulcer] : no ulcer [Skin Induration] : no induration [Alert] : alert [Oriented to Person] : oriented to person [Oriented to Place] : oriented to place [Oriented to Time] : oriented to time [Calm] : calm [de-identified] : wdwn nad [de-identified] : wnl [de-identified] : shellyl [FreeTextEntry1] : well healed surgical incisions right lower leg s/p stab phlebectomy [de-identified] : wnl [de-identified] : as above

## 2021-01-19 NOTE — HISTORY OF PRESENT ILLNESS
[FreeTextEntry1] : Excellent result. No complaints. Pt very happy with results. Sutures removed - new steris placed. Pt to followup 1/25 for left leg stab phlebectomy. Will also get sclerotherapy both legs after completed stab phlebectomy.

## 2021-01-24 ENCOUNTER — LABORATORY RESULT (OUTPATIENT)
Age: 66
End: 2021-01-24

## 2021-01-25 ENCOUNTER — APPOINTMENT (OUTPATIENT)
Dept: VASCULAR SURGERY | Facility: CLINIC | Age: 66
End: 2021-01-25
Payer: MEDICARE

## 2021-01-25 VITALS
OXYGEN SATURATION: 93 % | DIASTOLIC BLOOD PRESSURE: 68 MMHG | HEART RATE: 72 BPM | SYSTOLIC BLOOD PRESSURE: 109 MMHG | TEMPERATURE: 96.2 F

## 2021-01-25 PROCEDURE — 37765 STAB PHLEB VEINS XTR 10-20: CPT | Mod: LT

## 2021-01-25 NOTE — PROCEDURE
[FreeTextEntry3] : Left lower extremity varicose veins with pain, swelling, and venous insufficiency.\par  \par Varicose veins of the left lower extremity with inflammation.  Venous reflux/insufficiency, leg pain, limb swelling and cramps.  Chronic venous hypertension.\par \par Ms. ALVARO CHÁVEZ is a 65 year year old F with a history of left lower extremity varicose veins previously seen in the office.  Ultrasound examination demonstrated multiple varicosities in the patient’s left leg with venous reflux.  A trial of compression stockings, exercise, elevation, and pain medication was attempted without relief and as such, this patient was offered microphlebectomy therapy.  After explaining risks and benefits, informed consent was obtained and the patient agreed to proceed.  \par \par The patient was escorted to the procedure room.  The varicose veins for treatment were marked out and the patient agreed with the markings.  Patient was placed in the   []   position on the examination table.  The left leg was prepped and draped in the usual sterile fashion and time-out was called.  \par \par Local anesthesia was administered subcutaneously along the marked varicosities for treatment.  Anesthesia:  Local anesthesia 1% lidocaine without epinephrine.  Anesthesia is drawn up using 10 mL sterile syringe and 18G blunt fill needle, 10 mL sterile injectable lidocaine 1% (without Epinephrine) is drawn up.  18G blunt fill needle with 5 micron filter is removed and 25G 11/2 inch needle placed on syringe with medication for injection.  \par \par Total dose of 1%  lidocaine without epinephrine:  15\par \par  A total of   10 micro incisions were made over the varicosities that were previously marked out and the veins were grasped using a combination of mosquitoes and forceps.  The veins were removed.  At completion, hemostasis was ensured with digital pressure at the stab sites.  Once complete, the incisions were appropriately closed with 4-0 Monocryl suture and steri-strips.  The left leg was appropriately dressed with 4 inch Kerlix and 4 inch latex-free Ace bandage.  The patient tolerated the procedure well with no complications.  Vital signs stable, patient was monitored throughout.  Patient was escorted to the changing room.  Specimen sent to pathology for evaluation.\par \par Post-Op Instructions:  The following instructions were reviewed with the patient and a hard copy of the instructions provided:   1)  Surgical dressing to remain on for 72 hours.   2)  Do not get the surgical dressing wet for the first 3 days.  3)  Ambulation immediately following procedure and as much as possible.  4)  Contact the office if any questions or concerns; follow-up 2 weeks post-op for evaluation and suture removal.  Patient acknowledged understanding of post-op instructions and was provided with print out of instructions at time of visit.  Provided patient with Dr. Wagner’s cell number:  [phone].  All questions answered.  \par \par

## 2021-01-25 NOTE — REASON FOR VISIT
[Procedure: _________] : a [unfilled] procedure visit [FreeTextEntry1] : Patient with painful dilated branch varicose veins left leg.  She is here for left leg microphlebectomy.  ALVARO CHÁVEZ proceeded with left leg microphlebectomy without complications.  She will follow up in 2 weeks for routine visit and suture removal.\par

## 2021-02-09 ENCOUNTER — APPOINTMENT (OUTPATIENT)
Dept: VASCULAR SURGERY | Facility: CLINIC | Age: 66
End: 2021-02-09
Payer: MEDICARE

## 2021-02-09 VITALS
TEMPERATURE: 97.3 F | HEART RATE: 72 BPM | SYSTOLIC BLOOD PRESSURE: 115 MMHG | DIASTOLIC BLOOD PRESSURE: 75 MMHG | OXYGEN SATURATION: 93 %

## 2021-02-09 PROCEDURE — 99214 OFFICE O/P EST MOD 30 MIN: CPT

## 2021-02-09 NOTE — VITALS
Patient requesting a call back from the nurse to discuss questions on his insulin aspart (NOVOLOG FLEXPEN) 100 UNIT/ML pen-injector.    [Dull] : dull [Aching] : aching

## 2021-02-09 NOTE — REASON FOR VISIT
[Follow-Up: _____] : a [unfilled] follow-up visit [FreeTextEntry1] : status post left leg microphlebectomy.  Patient is here for routine 2 week post-operative follow-up and suture removal.    Sutures removed, steri-strips placed.   Incisions healing well without signs or symptoms of infection.  Instructed patient to protect incisions from the sun with use of sunscreen.  Pt to return for b/l leg sclerotherapy.\par

## 2021-02-09 NOTE — PHYSICAL EXAM
[JVD] : no jugular venous distention  [Normal Breath Sounds] : Normal breath sounds [2+] : left 2+ [Ankle Swelling (On Exam)] : present [Varicose Veins Of Lower Extremities] : present [Varicose Veins Of The Right Leg] : of the right leg [Ankle Swelling Bilaterally] : severe [] : bilaterally [Ankle Swelling On The Left] : moderate [No Rash or Lesion] : No rash or lesion [Purpura] : no purpura  [Petechiae] : no petechiae [Skin Ulcer] : no ulcer [Skin Induration] : no induration [Alert] : alert [Oriented to Person] : oriented to person [Oriented to Place] : oriented to place [Oriented to Time] : oriented to time [Calm] : calm [de-identified] : wdwn nad [de-identified] : wnl [de-identified] : shellyl [FreeTextEntry1] : well healed surgical incisions left lower leg s/p stab phlebectomy; b/l diffuse small surface branch veins [de-identified] : wnl [de-identified] : as above

## 2021-02-16 ENCOUNTER — APPOINTMENT (OUTPATIENT)
Dept: VASCULAR SURGERY | Facility: CLINIC | Age: 66
End: 2021-02-16
Payer: MEDICARE

## 2021-02-16 VITALS
SYSTOLIC BLOOD PRESSURE: 101 MMHG | HEART RATE: 70 BPM | TEMPERATURE: 97.4 F | DIASTOLIC BLOOD PRESSURE: 65 MMHG | OXYGEN SATURATION: 95 %

## 2021-02-16 PROCEDURE — 36471 NJX SCLRSNT MLT INCMPTNT VN: CPT | Mod: RT

## 2021-02-16 RX ORDER — POLIDOCANOL 0.01 G/ML
1 INJECTION, SOLUTION INTRAVENOUS
Qty: 0 | Refills: 0 | Status: COMPLETED | OUTPATIENT
Start: 2021-02-16

## 2021-02-16 RX ADMIN — POLIDOCANOL 4 %: 0.01 INJECTION, SOLUTION INTRAVENOUS at 00:00

## 2021-02-16 NOTE — REASON FOR VISIT
[Procedure: _________] : a [unfilled] procedure visit [FreeTextEntry1] : Patient with small painful and cramping, tender superficial branch varicose veins.  She is here for sclerotherapy session, right leg.  Ms. ALVARO CHÁVEZ will follow up in 2 weeks for next sclerotherapy session.\par

## 2021-02-16 NOTE — PROCEDURE
[FreeTextEntry3] : Right lower extremity sclerotherapy.\par \par Venous insufficiency.  Diffuse, painful right lower extremity small branch varicose veins with limb pain, swelling, itching, burning, tenderness and cramping.\par \par Ms. ALVARO CHÁVEZ is a 65 year year old F  with right lower extremity symptomatic small branch varicose veins.   The patient presented to the office for localized sclerotherapy injections.  After explaining risks and benefits, informed consent was obtained.  All questions answered.\par \par The patient was brought into the examination room and placed supine on procedure table.  Right leg for sclerotherapy treatment is cleaned with 70% isopropyl alcohol.  Sclerosant was mixed using 2 mL of 0.9% Sodium Chloride Injection and 2 mL of 1% polidocanol (Asclera) using filtered needle into sterile 5 mL syringe; 4 mL of 0.5% Asclera injected.  Filtered needle removed from syringe and sterile 30 G ½ inch needle is attached to syringe with sclerosant.  Using a 30 gauge needle, the sclerosant was directly injected into the symptomatic, superficial small branch varicose veins.   Injections were performed on right lower extremity and the veins were mostly localized to the   [].  At completion, treated area was wiped with dry gauze and thigh high 20-30 mmHg compression stockings placed, to be worn x 72 hours.  Patient tolerated the procedure well with no complications.\par Reviewed with the patient post procedure, sclerotherapy, instructions:  Patient instructed to wear compression stocking continuously for 3 days (72 hours) following the procedure and may take the stocking off for daily shower.  Walk for 15-20 minutes immediately following the procedure and daily for the next few days.  Avoid heavy exercise, sunbathing, hot baths/sauna, extended periods of sitting or standing, such as long plane flights for 2-3 days after treatment. Do not be alarmed if you see bruises, brown spots, black streaks, and lumps.  These are part of the healing process, and most will resolve with time.  It can take several weeks to see final results and additional treatments may be required.  Advised patient to call the office should they have any questions or concerns following sclerotherapy treatment. All questions answered.\par \par \par \par \par \par

## 2021-03-09 ENCOUNTER — APPOINTMENT (OUTPATIENT)
Dept: VASCULAR SURGERY | Facility: CLINIC | Age: 66
End: 2021-03-09
Payer: MEDICARE

## 2021-03-09 VITALS
HEART RATE: 87 BPM | TEMPERATURE: 96.9 F | DIASTOLIC BLOOD PRESSURE: 64 MMHG | OXYGEN SATURATION: 95 % | SYSTOLIC BLOOD PRESSURE: 105 MMHG

## 2021-03-09 PROCEDURE — 36471 NJX SCLRSNT MLT INCMPTNT VN: CPT | Mod: LT

## 2021-03-09 RX ORDER — POLIDOCANOL 0.01 G/ML
1 INJECTION, SOLUTION INTRAVENOUS
Qty: 0 | Refills: 0 | Status: COMPLETED | OUTPATIENT
Start: 2021-03-09

## 2021-03-09 RX ADMIN — POLIDOCANOL 4 %: 0.01 INJECTION, SOLUTION INTRAVENOUS at 00:00

## 2021-03-09 NOTE — REASON FOR VISIT
[Procedure: _________] : a [unfilled] procedure visit [FreeTextEntry1] : Patient with small painful and cramping, tender superficial branch varicose veins.  She is here for sclerotherapy session, left leg.  Ms. ALVARO CHÁVEZ will follow up in 2 weeks for next sclerotherapy session.\par

## 2021-03-09 NOTE — PROCEDURE
[FreeTextEntry3] : Left lower extremity sclerotherapy.\par \par Venous insufficiency.  Diffuse, painful right lower extremity small branch varicose veins with limb pain, swelling, itching, burning, tenderness and cramping.\par \par Ms. ALVARO CHÁVEZ is a 65 year year old F  with left lower extremity symptomatic small branch varicose veins.   The patient presented to the office for localized sclerotherapy injections.  After explaining risks and benefits, informed consent was obtained.  All questions answered.\par \par The patient was brought into the examination room and placed supine on procedure table.  Left leg for sclerotherapy treatment is cleaned with 70% isopropyl alcohol.  Sclerosant was mixed using 2 mL of 0.9% Sodium Chloride Injection and 2 mL of 1% polidocanol (Asclera) using filtered needle into sterile 5 mL syringe; 4 mL of 0.5% Asclera injected.  Filtered needle removed from syringe and sterile 30 G ½ inch needle is attached to syringe with sclerosant.  Using a 30 gauge needle, the sclerosant was directly injected into the symptomatic, superficial small branch varicose veins.   Injections were performed on left lower extremity and the veins were mostly localized to the   [].  At completion, treated area was wiped with dry gauze and thigh high 20-30 mmHg compression stockings placed, to be worn x 72 hours.  Patient tolerated the procedure well with no complications.\par Reviewed with the patient post procedure, sclerotherapy, instructions:  Patient instructed to wear compression stocking continuously for 3 days (72 hours) following the procedure and may take the stocking off for daily shower.  Walk for 15-20 minutes immediately following the procedure and daily for the next few days.  Avoid heavy exercise, sunbathing, hot baths/sauna, extended periods of sitting or standing, such as long plane flights for 2-3 days after treatment. Do not be alarmed if you see bruises, brown spots, black streaks, and lumps.  These are part of the healing process, and most will resolve with time.  It can take several weeks to see final results and additional treatments may be required.  Registered nurse advised patient to call the office should they have any questions or concerns following sclerotherapy treatment.  All questions answered.  \par \par \par \par

## 2021-03-30 ENCOUNTER — APPOINTMENT (OUTPATIENT)
Dept: VASCULAR SURGERY | Facility: CLINIC | Age: 66
End: 2021-03-30
Payer: MEDICARE

## 2021-03-30 VITALS
TEMPERATURE: 97.1 F | SYSTOLIC BLOOD PRESSURE: 99 MMHG | OXYGEN SATURATION: 98 % | DIASTOLIC BLOOD PRESSURE: 67 MMHG | HEART RATE: 67 BPM

## 2021-03-30 PROCEDURE — 36471 NJX SCLRSNT MLT INCMPTNT VN: CPT | Mod: RT

## 2021-03-30 RX ORDER — POLIDOCANOL 0.01 G/ML
1 INJECTION, SOLUTION INTRAVENOUS
Qty: 0 | Refills: 0 | Status: COMPLETED | OUTPATIENT
Start: 2021-03-30

## 2021-03-30 RX ADMIN — POLIDOCANOL 4 %: 0.01 INJECTION, SOLUTION INTRAVENOUS at 00:00

## 2021-03-30 NOTE — PROCEDURE
[FreeTextEntry3] : Right lower extremity sclerotherapy.\par \par Venous insufficiency.  Diffuse, painful right lower extremity small branch varicose veins with limb pain, swelling, itching, burning, tenderness and cramping.\par \par Ms. ALVARO CHÁVEZ is a 65 year year old F  with right lower extremity symptomatic small branch varicose veins.   The patient presented to the office for localized sclerotherapy injections.  After explaining risks and benefits, informed consent was obtained.  All questions answered.\par \par The patient was brought into the examination room and placed supine on procedure table.  Right leg for sclerotherapy treatment is cleaned with 70% isopropyl alcohol.  Sclerosant was mixed using 2 mL of 0.9% Sodium Chloride Injection and 2 mL of 1% polidocanol (Asclera) using filtered needle into sterile 5 mL syringe; 4 mL of 0.5% Asclera injected.  Filtered needle removed from syringe and sterile 30 G ½ inch needle is attached to syringe with sclerosant.  Using a 30 gauge needle, the sclerosant was directly injected into the symptomatic, superficial small branch varicose veins.   Injections were performed on right lower extremity and the veins were mostly localized to the   [].  At completion, treated area was wiped with dry gauze and thigh high 20-30 mmHg compression stockings placed, to be worn x 72 hours.  Patient tolerated the procedure well with no complications.\par Reviewed with the patient post procedure, sclerotherapy, instructions:  Patient instructed to wear compression stocking continuously for 3 days (72 hours) following the procedure and may take the stocking off for daily shower.  Walk for 15-20 minutes immediately following the procedure and daily for the next few days.  Avoid heavy exercise, sunbathing, hot baths/sauna, extended periods of sitting or standing, such as long plane flights for 2-3 days after treatment. Do not be alarmed if you see bruises, brown spots, black streaks, and lumps.  These are part of the healing process, and most will resolve with time.  It can take several weeks to see final results and additional treatments may be required.  Advised patient to call the office should they have any questions or concerns following sclerotherapy treatment. All questions answered.\par \par \par \par

## 2021-04-13 ENCOUNTER — APPOINTMENT (OUTPATIENT)
Dept: VASCULAR SURGERY | Facility: CLINIC | Age: 66
End: 2021-04-13
Payer: MEDICARE

## 2021-04-13 VITALS
TEMPERATURE: 97.3 F | SYSTOLIC BLOOD PRESSURE: 98 MMHG | DIASTOLIC BLOOD PRESSURE: 56 MMHG | OXYGEN SATURATION: 95 % | HEART RATE: 73 BPM

## 2021-04-13 PROCEDURE — 36471 NJX SCLRSNT MLT INCMPTNT VN: CPT | Mod: LT

## 2021-04-13 RX ORDER — POLIDOCANOL 0.01 G/ML
1 INJECTION, SOLUTION INTRAVENOUS
Qty: 0 | Refills: 0 | Status: COMPLETED | OUTPATIENT
Start: 2021-04-13

## 2021-04-13 RX ADMIN — POLIDOCANOL 4 %: 0.01 INJECTION, SOLUTION INTRAVENOUS at 00:00

## 2021-05-04 ENCOUNTER — APPOINTMENT (OUTPATIENT)
Dept: VASCULAR SURGERY | Facility: CLINIC | Age: 66
End: 2021-05-04
Payer: MEDICARE

## 2021-05-04 VITALS
SYSTOLIC BLOOD PRESSURE: 104 MMHG | HEART RATE: 73 BPM | TEMPERATURE: 97.3 F | DIASTOLIC BLOOD PRESSURE: 68 MMHG | OXYGEN SATURATION: 93 %

## 2021-05-04 PROCEDURE — 36471 NJX SCLRSNT MLT INCMPTNT VN: CPT | Mod: RT

## 2021-05-18 ENCOUNTER — APPOINTMENT (OUTPATIENT)
Dept: VASCULAR SURGERY | Facility: CLINIC | Age: 66
End: 2021-05-18
Payer: MEDICARE

## 2021-05-18 VITALS
DIASTOLIC BLOOD PRESSURE: 75 MMHG | TEMPERATURE: 98.3 F | OXYGEN SATURATION: 93 % | HEART RATE: 72 BPM | SYSTOLIC BLOOD PRESSURE: 116 MMHG

## 2021-05-18 PROCEDURE — 36471 NJX SCLRSNT MLT INCMPTNT VN: CPT | Mod: RT

## 2021-05-18 NOTE — PROCEDURE
[FreeTextEntry3] : Left lower extremity sclerotherapy.\par \par Venous insufficiency.  Diffuse, painful left lower extremity small branch varicose veins with limb pain, swelling, itching, burning, tenderness and cramping.\par \par Ms. ALVARO CHÁVEZ is a 65 year year old F  with left lower extremity symptomatic small branch varicose veins.   The patient presented to the office for localized sclerotherapy injections.  After explaining risks and benefits, informed consent was obtained.  All questions answered.\par \par The patient was brought into the examination room and placed supine on procedure table.  Left leg for sclerotherapy treatment is cleaned with 70% isopropyl alcohol.  Sclerosant was mixed using 2 mL of 0.9% Sodium Chloride Injection and 2 mL of 1% polidocanol (Asclera) using filtered needle into sterile 5 mL syringe; 4 mL of 0.5% Asclera injected.  Filtered needle removed from syringe and sterile 30 G ½ inch needle is attached to syringe with sclerosant.  Using a 30 gauge needle, the sclerosant was directly injected into the symptomatic, superficial small branch varicose veins.   Injections were performed on left lower extremity and the veins were mostly localized to the   [].  At completion, treated area was wiped with dry gauze and thigh high 20-30 mmHg compression stockings placed, to be worn x 72 hours.  Patient tolerated the procedure well with no complications.\par Reviewed with the patient post procedure, sclerotherapy, instructions:  Patient instructed to wear compression stocking continuously for 3 days (72 hours) following the procedure and may take the stocking off for daily shower.  Walk for 15-20 minutes immediately following the procedure and daily for the next few days.  Avoid heavy exercise, sunbathing, hot baths/sauna, extended periods of sitting or standing, such as long plane flights for 2-3 days after treatment. Do not be alarmed if you see bruises, brown spots, black streaks, and lumps.  These are part of the healing process, and most will resolve with time.  It can take several weeks to see final results and additional treatments may be required.  Advised patient to call the office should they have any questions or concerns following sclerotherapy treatment. All questions answered.\par \par \par \par

## 2021-06-01 ENCOUNTER — APPOINTMENT (OUTPATIENT)
Dept: VASCULAR SURGERY | Facility: CLINIC | Age: 66
End: 2021-06-01
Payer: MEDICARE

## 2021-06-01 VITALS
TEMPERATURE: 97.5 F | HEART RATE: 74 BPM | OXYGEN SATURATION: 97 % | DIASTOLIC BLOOD PRESSURE: 56 MMHG | SYSTOLIC BLOOD PRESSURE: 99 MMHG

## 2021-06-01 PROCEDURE — 36471 NJX SCLRSNT MLT INCMPTNT VN: CPT | Mod: LT

## 2021-06-22 ENCOUNTER — APPOINTMENT (OUTPATIENT)
Dept: VASCULAR SURGERY | Facility: CLINIC | Age: 66
End: 2021-06-22
Payer: MEDICARE

## 2021-06-22 VITALS
HEART RATE: 66 BPM | DIASTOLIC BLOOD PRESSURE: 64 MMHG | SYSTOLIC BLOOD PRESSURE: 107 MMHG | OXYGEN SATURATION: 95 % | TEMPERATURE: 96.8 F

## 2021-06-22 PROCEDURE — 36471 NJX SCLRSNT MLT INCMPTNT VN: CPT | Mod: RT

## 2021-07-06 ENCOUNTER — APPOINTMENT (OUTPATIENT)
Dept: VASCULAR SURGERY | Facility: CLINIC | Age: 66
End: 2021-07-06
Payer: MEDICARE

## 2021-07-06 PROCEDURE — 36471 NJX SCLRSNT MLT INCMPTNT VN: CPT | Mod: LT

## 2021-07-07 VITALS
SYSTOLIC BLOOD PRESSURE: 118 MMHG | HEART RATE: 68 BPM | OXYGEN SATURATION: 95 % | TEMPERATURE: 97.3 F | DIASTOLIC BLOOD PRESSURE: 67 MMHG

## 2021-07-20 ENCOUNTER — APPOINTMENT (OUTPATIENT)
Dept: VASCULAR SURGERY | Facility: CLINIC | Age: 66
End: 2021-07-20
Payer: MEDICARE

## 2021-07-20 VITALS
OXYGEN SATURATION: 97 % | TEMPERATURE: 98 F | SYSTOLIC BLOOD PRESSURE: 105 MMHG | HEART RATE: 72 BPM | DIASTOLIC BLOOD PRESSURE: 66 MMHG

## 2021-07-20 PROCEDURE — 36471 NJX SCLRSNT MLT INCMPTNT VN: CPT | Mod: RT

## 2021-08-03 ENCOUNTER — APPOINTMENT (OUTPATIENT)
Dept: VASCULAR SURGERY | Facility: CLINIC | Age: 66
End: 2021-08-03
Payer: MEDICARE

## 2021-08-03 VITALS
OXYGEN SATURATION: 95 % | DIASTOLIC BLOOD PRESSURE: 67 MMHG | SYSTOLIC BLOOD PRESSURE: 101 MMHG | HEART RATE: 67 BPM | TEMPERATURE: 97.2 F

## 2021-08-03 DIAGNOSIS — I87.2 VENOUS INSUFFICIENCY (CHRONIC) (PERIPHERAL): ICD-10-CM

## 2021-08-03 PROCEDURE — 36471 NJX SCLRSNT MLT INCMPTNT VN: CPT | Mod: LT

## 2021-08-17 ENCOUNTER — APPOINTMENT (OUTPATIENT)
Dept: VASCULAR SURGERY | Facility: CLINIC | Age: 66
End: 2021-08-17
Payer: MEDICARE

## 2021-08-17 VITALS
DIASTOLIC BLOOD PRESSURE: 74 MMHG | TEMPERATURE: 97.3 F | HEART RATE: 79 BPM | OXYGEN SATURATION: 95 % | SYSTOLIC BLOOD PRESSURE: 110 MMHG

## 2021-08-17 PROCEDURE — 36471 NJX SCLRSNT MLT INCMPTNT VN: CPT | Mod: RT

## 2021-09-07 ENCOUNTER — APPOINTMENT (OUTPATIENT)
Dept: VASCULAR SURGERY | Facility: CLINIC | Age: 66
End: 2021-09-07
Payer: MEDICARE

## 2021-09-07 VITALS
SYSTOLIC BLOOD PRESSURE: 118 MMHG | TEMPERATURE: 97.6 F | DIASTOLIC BLOOD PRESSURE: 66 MMHG | HEART RATE: 64 BPM | OXYGEN SATURATION: 96 %

## 2021-09-07 PROCEDURE — 36471 NJX SCLRSNT MLT INCMPTNT VN: CPT | Mod: LT

## 2021-09-07 NOTE — PROCEDURE
[FreeTextEntry3] : Left lower extremity sclerotherapy.\par \par Venous insufficiency.  Diffuse, painful right lower extremity small branch varicose veins with limb pain, swelling, itching, burning, tenderness and cramping.\par \par Ms. ALVARO CHÁVEZ is a 65 year year old F  with left lower extremity symptomatic small branch varicose veins.   The patient presented to the office for localized sclerotherapy injections.  After explaining risks and benefits, informed consent was obtained.  All questions answered.\par \par The patient was brought into the examination room and placed supine on procedure table.  Left leg for sclerotherapy treatment is cleaned with 70% isopropyl alcohol.  Sclerosant was mixed using 2 mL of 0.9% Sodium Chloride Injection and 2 mL of 1% polidocanol (Asclera) using filtered needle into sterile 5 mL syringe; 4 mL of 0.5% Asclera injected.  Filtered needle removed from syringe and sterile 30 G ½ inch needle is attached to syringe with sclerosant.  Using a 30 gauge needle, the sclerosant was directly injected into the symptomatic, superficial small branch varicose veins.   Injections were performed on left lower extremity and the veins were mostly localized to the left posterior leg.  At completion, treated area was wiped with dry gauze and thigh high 20-30 mmHg compression stockings placed, to be worn x 72 hours.  Patient tolerated the procedure well with no complications.\par Reviewed with the patient post procedure, sclerotherapy, instructions:  Patient instructed to wear compression stocking continuously for 3 days (72 hours) following the procedure and may take the stocking off for daily shower.  Walk for 15-20 minutes immediately following the procedure and daily for the next few days.  Avoid heavy exercise, sunbathing, hot baths/sauna, extended periods of sitting or standing, such as long plane flights for 2-3 days after treatment. Do not be alarmed if you see bruises, brown spots, black streaks, and lumps.  These are part of the healing process, and most will resolve with time.  It can take several weeks to see final results and additional treatments may be required.  Registered nurse advised patient to call the office should they have any questions or concerns following sclerotherapy treatment.  All questions answered.  \par \par \par \par

## 2021-09-21 ENCOUNTER — APPOINTMENT (OUTPATIENT)
Dept: VASCULAR SURGERY | Facility: CLINIC | Age: 66
End: 2021-09-21
Payer: MEDICARE

## 2021-09-21 VITALS
TEMPERATURE: 98.2 F | SYSTOLIC BLOOD PRESSURE: 114 MMHG | DIASTOLIC BLOOD PRESSURE: 71 MMHG | HEART RATE: 67 BPM | OXYGEN SATURATION: 96 %

## 2021-09-21 PROCEDURE — 36471 NJX SCLRSNT MLT INCMPTNT VN: CPT | Mod: RT

## 2021-09-21 NOTE — PROCEDURE
[FreeTextEntry3] : Right lower extremity sclerotherapy.\par \par Venous insufficiency.  Diffuse, painful right lower extremity small branch varicose veins with limb pain, swelling, itching, burning, tenderness and cramping.\par \par Ms. ALVARO CHÁVEZ is a 66 year year old F  with right lower extremity symptomatic small branch varicose veins.   The patient presented to the office for localized sclerotherapy injections.  After explaining risks and benefits, informed consent was obtained.  All questions answered.\par \par The patient was brought into the examination room and placed supine on procedure table.  Right leg for sclerotherapy treatment is cleaned with 70% isopropyl alcohol.  Sclerosant was mixed using 2 mL of 0.9% Sodium Chloride Injection and 2 mL of 1% polidocanol (Asclera) using filtered needle into sterile 5 mL syringe; 4 mL of 0.5% Asclera injected.  Filtered needle removed from syringe and sterile 30 G ½ inch needle is attached to syringe with sclerosant.  Using a 30 gauge needle, the sclerosant was directly injected into the symptomatic, superficial small branch varicose veins.   Injections were performed on right lower extremity and the veins were mostly localized to the   [].  At completion, treated area was wiped with dry gauze and thigh high 20-30 mmHg compression stockings placed, to be worn x 72 hours.  Patient tolerated the procedure well with no complications.\par Reviewed with the patient post procedure, sclerotherapy, instructions:  Patient instructed to wear compression stocking continuously for 3 days (72 hours) following the procedure and may take the stocking off for daily shower.  Walk for 15-20 minutes immediately following the procedure and daily for the next few days.  Avoid heavy exercise, sunbathing, hot baths/sauna, extended periods of sitting or standing, such as long plane flights for 2-3 days after treatment. Do not be alarmed if you see bruises, brown spots, black streaks, and lumps.  These are part of the healing process, and most will resolve with time.  It can take several weeks to see final results and additional treatments may be required.  Advised patient to call the office should they have any questions or concerns following sclerotherapy treatment. All questions answered.\par \par \par \par

## 2021-10-05 ENCOUNTER — APPOINTMENT (OUTPATIENT)
Dept: VASCULAR SURGERY | Facility: CLINIC | Age: 66
End: 2021-10-05
Payer: MEDICARE

## 2021-10-05 VITALS
TEMPERATURE: 97.3 F | DIASTOLIC BLOOD PRESSURE: 65 MMHG | OXYGEN SATURATION: 97 % | HEART RATE: 81 BPM | SYSTOLIC BLOOD PRESSURE: 98 MMHG

## 2021-10-05 DIAGNOSIS — M79.89 PAIN IN RIGHT LOWER LEG: ICD-10-CM

## 2021-10-05 DIAGNOSIS — M79.661 PAIN IN RIGHT LOWER LEG: ICD-10-CM

## 2021-10-05 PROCEDURE — 37765 STAB PHLEB VEINS XTR 10-20: CPT | Mod: RT

## 2021-10-05 NOTE — REASON FOR VISIT
[Procedure: _________] : a [unfilled] procedure visit [FreeTextEntry1] : Patient with painful dilated branch varicose veins right leg.  She is here for right posterior leg microphlebectomy.  ALVARO CHÁVEZ proceeded with right leg microphlebectomy without complications.  She will follow up in 2 weeks for routine visit and suture removal.\par

## 2021-10-05 NOTE — PROCEDURE
[FreeTextEntry3] : Right lower extremity varicose veins with pain, swelling, and venous insufficiency.\par  \par Varicose veins of the right lower extremity with inflammation.  Venous reflux/insufficiency, leg pain, limb swelling and cramps.  Chronic venous hypertension.\par \par Ms. ALVARO CHÁVEZ is a 66 year year old F with a history of right lower extremity varicose veins previously seen in the office.  Ultrasound examination demonstrated multiple varicosities in the patient’s right leg with venous reflux.  A trial of compression stockings, exercise, elevation, and pain medication was attempted without relief and as such, this patient was offered microphlebectomy therapy.  After explaining risks and benefits, informed consent was obtained and the patient agreed to proceed.  \par \par The patient was escorted to the procedure room.  The varicose veins for treatment were marked out and the patient agreed with the markings.  Patient was placed in the   []   position on the examination table.  The right leg was prepped and draped in the usual sterile fashion and time-out was called.  \par \par Local anesthesia was administered subcutaneously along the marked varicosities for treatment.  Anesthesia:  1% lidocaine without epinephrine.  18G blunt fill needle with 5 micron filter is removed and 25G 11/2 inch needle placed on syringe with medication for injection.  \par \par Total dose of lidocaine without epinephrine:  [] \par \par  A total of   []  micro incisions were made over the varicosities that were previously marked out and the veins were grasped using a combination of mosquitoes and forceps.  The veins were removed.  At completion, hemostasis was ensured with digital pressure at the stab sites.  Once complete, the incisions were appropriately closed with 4-0 Monocryl suture and steri-strips.  The right leg was appropriately dressed with 4 inch Kerlix and 4 inch latex-free Ace bandage.  The patient tolerated the procedure well with no complications.  Vital signs stable, patient was monitored throughout.  Patient was escorted to the changing room.  Specimen sent to pathology for evaluation.\par \par Post-Op Instructions:  The following instructions were reviewed with the patient and a hard copy of the instructions provided:   1)  Surgical dressing to remain on for 72 hours.   2)  Do not get the surgical dressing wet for the first 3 days.  3)  Ambulation immediately following procedure and as much as possible.  4)  Contact the office if any questions or concerns; follow-up 2 weeks post-op for evaluation and suture removal.  Patient acknowledged understanding of post-op instructions and was provided with print out of instructions at time of visit.  Provided patient with Dr. Wagner’s cell number:  [phone].  All questions answered.\par \par

## 2021-10-11 LAB — CORE LAB BIOPSY: NORMAL

## 2021-10-19 ENCOUNTER — APPOINTMENT (OUTPATIENT)
Dept: VASCULAR SURGERY | Facility: CLINIC | Age: 66
End: 2021-10-19
Payer: MEDICARE

## 2021-10-19 VITALS
DIASTOLIC BLOOD PRESSURE: 70 MMHG | OXYGEN SATURATION: 95 % | TEMPERATURE: 97.4 F | SYSTOLIC BLOOD PRESSURE: 105 MMHG | HEART RATE: 66 BPM

## 2021-10-19 PROCEDURE — 36471 NJX SCLRSNT MLT INCMPTNT VN: CPT | Mod: LT

## 2021-10-19 NOTE — REASON FOR VISIT
[Procedure: _________] : a [unfilled] procedure visit [FreeTextEntry1] : Patient with small painful and cramping, tender superficial branch varicose veins.  She is here for sclerotherapy session, left leg.  Ms. ALVARO CHÁVEZ will follow up in 2 weeks for next sclerotherapy session.\par  Pt also had right posterior leg sutures removed after recent rt leg stab phlebectomy - excellent results, healing well.

## 2021-10-19 NOTE — PROCEDURE
[FreeTextEntry3] : Left lower extremity sclerotherapy.\par \par Venous insufficiency.  Diffuse, painful right lower extremity small branch varicose veins with limb pain, swelling, itching, burning, tenderness and cramping.\par \par Ms. ALVARO CHÁVEZ is a 66 year year old F  with left lower extremity symptomatic small branch varicose veins.   The patient presented to the office for localized sclerotherapy injections.  After explaining risks and benefits, informed consent was obtained.  All questions answered.\par \par The patient was brought into the examination room and placed supine on procedure table.  Left leg for sclerotherapy treatment is cleaned with 70% isopropyl alcohol.  Sclerosant was mixed using 2 mL of 0.9% Sodium Chloride Injection and 2 mL of 1% polidocanol (Asclera) using filtered needle into sterile 5 mL syringe; 4 mL of 0.5% Asclera injected.  Filtered needle removed from syringe and sterile 30 G ½ inch needle is attached to syringe with sclerosant.  Using a 30 gauge needle, the sclerosant was directly injected into the symptomatic, superficial small branch varicose veins.   Injections were performed on left lower extremity and the veins were mostly localized to the   [].  At completion, treated area was wiped with dry gauze and thigh high 20-30 mmHg compression stockings placed, to be worn x 72 hours.  Patient tolerated the procedure well with no complications.\par Reviewed with the patient post procedure, sclerotherapy, instructions:  Patient instructed to wear compression stocking continuously for 3 days (72 hours) following the procedure and may take the stocking off for daily shower.  Walk for 15-20 minutes immediately following the procedure and daily for the next few days.  Avoid heavy exercise, sunbathing, hot baths/sauna, extended periods of sitting or standing, such as long plane flights for 2-3 days after treatment. Do not be alarmed if you see bruises, brown spots, black streaks, and lumps.  These are part of the healing process, and most will resolve with time.  It can take several weeks to see final results and additional treatments may be required.  Registered nurse advised patient to call the office should they have any questions or concerns following sclerotherapy treatment.  All questions answered.  \par \par \par \par

## 2021-11-02 ENCOUNTER — APPOINTMENT (OUTPATIENT)
Dept: VASCULAR SURGERY | Facility: CLINIC | Age: 66
End: 2021-11-02
Payer: MEDICARE

## 2021-11-02 VITALS
SYSTOLIC BLOOD PRESSURE: 107 MMHG | HEART RATE: 72 BPM | TEMPERATURE: 97.3 F | DIASTOLIC BLOOD PRESSURE: 67 MMHG | OXYGEN SATURATION: 97 %

## 2021-11-02 PROCEDURE — 36471 NJX SCLRSNT MLT INCMPTNT VN: CPT | Mod: RT

## 2021-11-16 ENCOUNTER — APPOINTMENT (OUTPATIENT)
Dept: VASCULAR SURGERY | Facility: CLINIC | Age: 66
End: 2021-11-16
Payer: MEDICARE

## 2021-11-16 VITALS
DIASTOLIC BLOOD PRESSURE: 67 MMHG | TEMPERATURE: 97.6 F | OXYGEN SATURATION: 95 % | HEART RATE: 72 BPM | SYSTOLIC BLOOD PRESSURE: 108 MMHG

## 2021-11-16 PROCEDURE — 36471 NJX SCLRSNT MLT INCMPTNT VN: CPT | Mod: LT

## 2021-11-30 ENCOUNTER — APPOINTMENT (OUTPATIENT)
Dept: VASCULAR SURGERY | Facility: CLINIC | Age: 66
End: 2021-11-30
Payer: MEDICARE

## 2021-11-30 VITALS
SYSTOLIC BLOOD PRESSURE: 115 MMHG | OXYGEN SATURATION: 93 % | DIASTOLIC BLOOD PRESSURE: 57 MMHG | TEMPERATURE: 97.3 F | HEART RATE: 77 BPM

## 2021-11-30 DIAGNOSIS — I83.811 VARICOSE VEINS OF RIGHT LOWER EXTREMITY WITH PAIN: ICD-10-CM

## 2021-11-30 DIAGNOSIS — I83.891 VARICOSE VEINS OF RIGHT LOWER EXTREMITY WITH OTHER COMPLICATIONS: ICD-10-CM

## 2021-11-30 DIAGNOSIS — I83.11 VARICOSE VEINS OF RIGHT LOWER EXTREMITY WITH INFLAMMATION: ICD-10-CM

## 2021-11-30 PROCEDURE — 36471 NJX SCLRSNT MLT INCMPTNT VN: CPT | Mod: RT

## 2021-12-14 ENCOUNTER — APPOINTMENT (OUTPATIENT)
Dept: VASCULAR SURGERY | Facility: CLINIC | Age: 66
End: 2021-12-14
Payer: MEDICARE

## 2021-12-14 VITALS
TEMPERATURE: 97 F | OXYGEN SATURATION: 95 % | HEART RATE: 80 BPM | DIASTOLIC BLOOD PRESSURE: 76 MMHG | SYSTOLIC BLOOD PRESSURE: 121 MMHG

## 2021-12-14 DIAGNOSIS — I83.12 VARICOSE VEINS OF LEFT LOWER EXTREMITY WITH INFLAMMATION: ICD-10-CM

## 2021-12-14 DIAGNOSIS — M79.662 PAIN IN LEFT LOWER LEG: ICD-10-CM

## 2021-12-14 DIAGNOSIS — I83.812 VARICOSE VEINS OF LEFT LOWER EXTREMITY WITH PAIN: ICD-10-CM

## 2021-12-14 DIAGNOSIS — M79.89 PAIN IN LEFT LOWER LEG: ICD-10-CM

## 2021-12-14 DIAGNOSIS — I83.892 VARICOSE VEINS OF LEFT LOWER EXTREMITY WITH OTHER COMPLICATIONS: ICD-10-CM

## 2021-12-14 PROCEDURE — 36471 NJX SCLRSNT MLT INCMPTNT VN: CPT | Mod: LT

## 2022-09-16 ENCOUNTER — APPOINTMENT (OUTPATIENT)
Dept: SURGERY | Facility: CLINIC | Age: 67
End: 2022-09-16

## 2022-09-16 VITALS
HEIGHT: 64 IN | TEMPERATURE: 97.9 F | DIASTOLIC BLOOD PRESSURE: 73 MMHG | OXYGEN SATURATION: 97 % | WEIGHT: 116.25 LBS | BODY MASS INDEX: 19.85 KG/M2 | SYSTOLIC BLOOD PRESSURE: 123 MMHG | HEART RATE: 69 BPM

## 2022-09-16 PROCEDURE — 99203 OFFICE O/P NEW LOW 30 MIN: CPT

## 2022-09-26 NOTE — PHYSICAL EXAM
[Oriented to Person] : oriented to person [Oriented to Place] : oriented to place [Oriented to Time] : oriented to time [Calm] : calm [Abdominal Masses] : No abdominal masses [Abdomen Tenderness] : ~T ~M No abdominal tenderness [Tender] : was nontender [Enlarged] : not enlarged [Stool Sample Taken] : No stool obtained  on rectal exam [Occult Blood Positive] : was negative for occult blood [de-identified] : NAD, comfortable [de-identified] : Normocephalic, atraumatic. No scleral icterus.  [de-identified] : Supple, no JVD or cervical lymphadenopathy.  [de-identified] : No respiratory distress  [de-identified] : +BS soft, nontender, nondistended. There is a ventral hernia, reducible, nontender. \par

## 2022-09-26 NOTE — HISTORY OF PRESENT ILLNESS
[de-identified] : This is a 66 y/o female presenting to the office for evaluation and management of a ventral hernia. Reports she first noticed a bulge in her abdomen x 2 years ago. Reports the bugle has increased in size since first noticed. She denies any significant pain or discomfort. Denies any urinary or obstructive issues. Denies any surgical history. She reports she has seen another surgeon for this and wishes to have a second opinion. Denies any fever, chills, abdominal pain, nausea, vomiting or constipation.

## 2022-09-26 NOTE — ASSESSMENT
[FreeTextEntry1] : 68 y/o female with ventral hernia. Wishes to have this repaired. \par \par We discussed the risk,benefits and alternatives to OPEN ventral hernia repair with mesh in detail including but not limited to bleeding, infection, death, disability, blood clots, cardiac, pulmonary, neurological problems, prolonged hospital course, need for additional procedures, need for implants, recurrence of the hernia, displeasure with cosmetic outcome and other issues. Patient expressed understanding and wishes to proceed with surgery. All questions were answered. \par \par Plan:\par OPEN ventral hernia repair with mesh

## 2022-11-14 ENCOUNTER — TRANSCRIPTION ENCOUNTER (OUTPATIENT)
Age: 67
End: 2022-11-14

## 2022-11-14 RX ORDER — CHLORHEXIDINE GLUCONATE 213 G/1000ML
1 SOLUTION TOPICAL DAILY
Refills: 0 | Status: DISCONTINUED | OUTPATIENT
Start: 2022-11-15 | End: 2022-11-15

## 2022-11-14 NOTE — ASU PATIENT PROFILE, ADULT - NS PREOP UNDERSTANDS INFO
Covid PCR test done 11/12/22 @ [facility].  NPO solids after midnight 11/14/22, clears after 7:30 am on DOS, no contact lenses or jewelry,  bring insurance card and photo ID, Escort to bring photo ID and proof of Covid vaccine or a negative Covid test within 3 DOS, address and phone # reviewed with pt./yes

## 2022-11-15 ENCOUNTER — APPOINTMENT (OUTPATIENT)
Dept: SURGERY | Facility: AMBULATORY SURGERY CENTER | Age: 67
End: 2022-11-15

## 2022-11-15 ENCOUNTER — TRANSCRIPTION ENCOUNTER (OUTPATIENT)
Age: 67
End: 2022-11-15

## 2022-11-15 ENCOUNTER — OUTPATIENT (OUTPATIENT)
Dept: OUTPATIENT SERVICES | Facility: HOSPITAL | Age: 67
LOS: 1 days | Discharge: HOME | End: 2022-11-15

## 2022-11-15 VITALS
DIASTOLIC BLOOD PRESSURE: 68 MMHG | WEIGHT: 113.54 LBS | HEIGHT: 64 IN | RESPIRATION RATE: 16 BRPM | SYSTOLIC BLOOD PRESSURE: 115 MMHG | TEMPERATURE: 98 F | OXYGEN SATURATION: 97 % | HEART RATE: 66 BPM

## 2022-11-15 VITALS
DIASTOLIC BLOOD PRESSURE: 65 MMHG | OXYGEN SATURATION: 98 % | SYSTOLIC BLOOD PRESSURE: 119 MMHG | HEART RATE: 65 BPM | RESPIRATION RATE: 12 BRPM

## 2022-11-15 DIAGNOSIS — Z86.79 PERSONAL HISTORY OF OTHER DISEASES OF THE CIRCULATORY SYSTEM: Chronic | ICD-10-CM

## 2022-11-15 PROCEDURE — 49560: CPT | Mod: GC

## 2022-11-15 RX ORDER — ACETAMINOPHEN 500 MG
1000 TABLET ORAL ONCE
Refills: 0 | Status: COMPLETED | OUTPATIENT
Start: 2022-11-15 | End: 2022-11-15

## 2022-11-15 RX ORDER — FENTANYL CITRATE 50 UG/ML
25 INJECTION INTRAVENOUS
Refills: 0 | Status: DISCONTINUED | OUTPATIENT
Start: 2022-11-15 | End: 2022-11-15

## 2022-11-15 RX ORDER — APREPITANT 80 MG/1
40 CAPSULE ORAL ONCE
Refills: 0 | Status: COMPLETED | OUTPATIENT
Start: 2022-11-15 | End: 2022-11-15

## 2022-11-15 RX ORDER — SODIUM CHLORIDE 9 MG/ML
1000 INJECTION, SOLUTION INTRAVENOUS
Refills: 0 | Status: DISCONTINUED | OUTPATIENT
Start: 2022-11-15 | End: 2022-11-15

## 2022-11-15 RX ORDER — ONDANSETRON 8 MG/1
4 TABLET, FILM COATED ORAL ONCE
Refills: 0 | Status: DISCONTINUED | OUTPATIENT
Start: 2022-11-15 | End: 2022-11-15

## 2022-11-15 RX ADMIN — CHLORHEXIDINE GLUCONATE 1 APPLICATION(S): 213 SOLUTION TOPICAL at 09:11

## 2022-11-15 RX ADMIN — Medication 1000 MILLIGRAM(S): at 09:10

## 2022-11-15 RX ADMIN — APREPITANT 40 MILLIGRAM(S): 80 CAPSULE ORAL at 09:10

## 2022-11-15 NOTE — BRIEF OPERATIVE NOTE - OPERATION/FINDINGS
Patient plsced under general MAC anesthesia. Open ventral hernia repair perfromed of small defect in epigastric area closed primarily with Maxon suture. Incision closed with 2-0 vicryl and 4-0 monocryl.

## 2022-11-15 NOTE — ASU DISCHARGE PLAN (ADULT/PEDIATRIC) - ASU DC SPECIAL INSTRUCTIONSFT
Please follow up with Dr. Flores, Please take tylenol and advil every 6 hours for pain. Please follow up with Dr. Flores, Please take tylenol and advil every 6 hours for pain. You may eat and shower as normal. Please avoid heavy lifting for 2 weeks

## 2022-11-15 NOTE — ASU DISCHARGE PLAN (ADULT/PEDIATRIC) - CARE PROVIDER_API CALL
Darian Flores (MD)  Surgery  [address], [facility], [place] [address]  Phone: [phone]  Fax: [phone]  Follow Up Time:

## 2022-11-15 NOTE — ASU DISCHARGE PLAN (ADULT/PEDIATRIC) - NS MD DC FALL RISK RISK
For information on Fall & Injury Prevention, visit: [email or web address] OR  [email or web address] OR  https://www.cdc.gov/steadi/patient.html

## 2022-11-15 NOTE — ASU PREOP CHECKLIST - ISOLATION PRECAUTIONS
Instructions: This plan will send the code FBSD to the PM system.  DO NOT or CHANGE the price. Detail Level: Simple Price (Do Not Change): 0.00 none

## 2022-11-16 ENCOUNTER — NON-APPOINTMENT (OUTPATIENT)
Age: 67
End: 2022-11-16

## 2022-12-02 ENCOUNTER — APPOINTMENT (OUTPATIENT)
Dept: SURGERY | Facility: CLINIC | Age: 67
End: 2022-12-02

## 2022-12-02 VITALS
WEIGHT: 113.5 LBS | HEART RATE: 76 BPM | BODY MASS INDEX: 19.38 KG/M2 | TEMPERATURE: 97.6 F | DIASTOLIC BLOOD PRESSURE: 79 MMHG | SYSTOLIC BLOOD PRESSURE: 139 MMHG | OXYGEN SATURATION: 98 % | HEIGHT: 64 IN

## 2022-12-02 PROCEDURE — 99024 POSTOP FOLLOW-UP VISIT: CPT

## 2022-12-02 NOTE — HISTORY OF PRESENT ILLNESS
[de-identified] : This is a 66 y/o female presenting to the office for evaluation and management of a ventral hernia. Reports she first noticed a bulge in her abdomen x 2 years ago. Reports the bugle has increased in size since first noticed. She denies any significant pain or discomfort. Denies any urinary or obstructive issues. Denies any surgical history. She reports she has seen another surgeon for this and wishes to have a second opinion. Denies any fever, chills, abdominal pain, nausea, vomiting or constipation.  [de-identified] : 12-02-22: Patient returns today for a routine post operative visit. Now s/p open ventral hernia repair on 11/15/22.  She states she is overall feeling well. Eating and drinking as usual. Regular bowel movements and voiding as usual. Denies any discomfort or pain. Denies fever, chest pain, shortness of breath, nausea, vomiting or constipation.

## 2022-12-02 NOTE — ASSESSMENT
[FreeTextEntry1] : 68 y/o female with ventral hernia. Now s/p repair. Patient seems to be doing well post operatively and recovering as expected. Discussed gradual return to normal activity and natural scar maturation. Will f/u in 1 month. All questions answered.

## 2022-12-02 NOTE — PHYSICAL EXAM
[Oriented to Person] : oriented to person [Oriented to Place] : oriented to place [Oriented to Time] : oriented to time [Calm] : calm [Abdominal Masses] : No abdominal masses [Abdomen Tenderness] : ~T ~M No abdominal tenderness [Tender] : was nontender [Enlarged] : not enlarged [Stool Sample Taken] : No stool obtained  on rectal exam [Occult Blood Positive] : was negative for occult blood [de-identified] : NAD, comfortable [de-identified] : Normocephalic, atraumatic. No scleral icterus.  [de-identified] : Supple, no JVD or cervical lymphadenopathy.  [de-identified] : No respiratory distress  [de-identified] : +BS soft, nontender, nondistended. Incision healing well, c/d/i. Dermabond removed. No surrounding erythema or induration. No evidence of hernia recurrence on exam with Valsalva. \par

## 2023-01-20 ENCOUNTER — APPOINTMENT (OUTPATIENT)
Dept: SURGERY | Facility: CLINIC | Age: 68
End: 2023-01-20
Payer: MEDICARE

## 2023-01-20 VITALS
HEART RATE: 70 BPM | OXYGEN SATURATION: 97 % | TEMPERATURE: 97.2 F | SYSTOLIC BLOOD PRESSURE: 117 MMHG | WEIGHT: 114 LBS | DIASTOLIC BLOOD PRESSURE: 73 MMHG | HEIGHT: 64 IN | BODY MASS INDEX: 19.46 KG/M2

## 2023-01-20 DIAGNOSIS — K43.9 VENTRAL HERNIA W/OUT OBSTRUCTION OR GANGRENE: ICD-10-CM

## 2023-01-20 PROCEDURE — 99024 POSTOP FOLLOW-UP VISIT: CPT

## 2023-01-20 NOTE — HISTORY OF PRESENT ILLNESS
[de-identified] : This is a 66 y/o female presenting to the office for evaluation and management of a ventral hernia. Reports she first noticed a bulge in her abdomen x 2 years ago. Reports the bugle has increased in size since first noticed. She denies any significant pain or discomfort. Denies any urinary or obstructive issues. Denies any surgical history. She reports she has seen another surgeon for this and wishes to have a second opinion. Denies any fever, chills, abdominal pain, nausea, vomiting or constipation.  [de-identified] : 12-02-22: Patient returns today for a routine post operative visit. Now s/p open ventral hernia repair on 11/15/22.  She states she is overall feeling well. Eating and drinking as usual. Regular bowel movements and voiding as usual. Denies any discomfort or pain. Denies fever, chest pain, shortness of breath, nausea, vomiting or constipation. \par \par 01-20-23: Returns to office today. She states she has been working with  at gym and has not been lifting over 20lbs at the gym. Doing cardio exercises with no issues. She denies any pain or discomfort. Denies any fever, chills, chest pain, sob, n/v/c.

## 2023-01-20 NOTE — ASSESSMENT
[FreeTextEntry1] : 66 y/o female with ventral hernia. Now s/p repair on 11/15/22. Continues to do well post operatively and recover as expected. Has returned back to gym with no issues. Discussed may gradual return to regular/routine weight. No restrictions. Encouraged gradual return to usual weights. She expressed understanding. F/U prn. All questions answered.

## 2023-01-20 NOTE — PHYSICAL EXAM
[Oriented to Person] : oriented to person [Oriented to Place] : oriented to place [Oriented to Time] : oriented to time [Calm] : calm [Abdominal Masses] : No abdominal masses [Abdomen Tenderness] : ~T ~M No abdominal tenderness [Tender] : was nontender [Enlarged] : not enlarged [Stool Sample Taken] : No stool obtained  on rectal exam [Occult Blood Positive] : was negative for occult blood [de-identified] : NAD, comfortable [de-identified] : Normocephalic, atraumatic. No scleral icterus.  [de-identified] : Supple, no JVD or cervical lymphadenopathy.  [de-identified] : No respiratory distress  [de-identified] : +BS soft, nontender, nondistended. Incision well healed. . No surrounding erythema or induration. No evidence of hernia recurrence on exam with Valsalva.

## 2023-07-19 ENCOUNTER — APPOINTMENT (OUTPATIENT)
Dept: OPHTHALMOLOGY | Facility: CLINIC | Age: 68
End: 2023-07-19

## 2023-07-19 ENCOUNTER — OUTPATIENT (OUTPATIENT)
Dept: OUTPATIENT SERVICES | Facility: HOSPITAL | Age: 68
LOS: 1 days | End: 2023-07-19

## 2023-07-19 DIAGNOSIS — Z86.79 PERSONAL HISTORY OF OTHER DISEASES OF THE CIRCULATORY SYSTEM: Chronic | ICD-10-CM

## 2023-07-21 DIAGNOSIS — H40.212 ACUTE ANGLE-CLOSURE GLAUCOMA, LEFT EYE: ICD-10-CM
